# Patient Record
Sex: FEMALE | Race: ASIAN | URBAN - METROPOLITAN AREA
[De-identification: names, ages, dates, MRNs, and addresses within clinical notes are randomized per-mention and may not be internally consistent; named-entity substitution may affect disease eponyms.]

---

## 2017-01-03 ENCOUNTER — TELEPHONE (OUTPATIENT)
Dept: ALLERGY | Facility: CLINIC | Age: 37
End: 2017-01-03

## 2017-01-03 NOTE — TELEPHONE ENCOUNTER
Called and LM for patient to notify allergy shot hours have been shortened to 4pm today due to physician coverage.  Please arrive at least 15 minutes prior if planning to come in today.  Please call with any further questions or concerns.

## 2017-01-09 ENCOUNTER — NURSE ONLY (OUTPATIENT)
Dept: ALLERGY | Facility: CLINIC | Age: 37
End: 2017-01-09

## 2017-01-09 DIAGNOSIS — J30.89 ENVIRONMENTAL AND SEASONAL ALLERGIES: Primary | ICD-10-CM

## 2017-01-09 PROCEDURE — 95117 IMMUNOTHERAPY INJECTIONS: CPT | Performed by: INTERNAL MEDICINE

## 2017-01-31 ENCOUNTER — NURSE ONLY (OUTPATIENT)
Dept: ALLERGY | Facility: CLINIC | Age: 37
End: 2017-01-31

## 2017-01-31 DIAGNOSIS — J30.89 ENVIRONMENTAL AND SEASONAL ALLERGIES: Primary | ICD-10-CM

## 2017-01-31 PROCEDURE — 95117 IMMUNOTHERAPY INJECTIONS: CPT | Performed by: ALLERGY & IMMUNOLOGY

## 2017-02-02 RX ORDER — LEVOCETIRIZINE DIHYDROCHLORIDE 5 MG/1
5 TABLET, FILM COATED ORAL NIGHTLY
Qty: 30 TABLET | Refills: 3 | Status: SHIPPED | OUTPATIENT
Start: 2017-02-02 | End: 2017-06-20

## 2017-02-02 RX ORDER — OLOPATADINE HYDROCHLORIDE 665 UG/1
SPRAY NASAL
Qty: 1 BOTTLE | Refills: 4 | Status: SHIPPED | OUTPATIENT
Start: 2017-02-02 | End: 2017-02-06 | Stop reason: ALTCHOICE

## 2017-02-02 NOTE — TELEPHONE ENCOUNTER
Dr. Omar Huynh,. Per pt she was confused and is requesting refill for Xyzal 5 mg once daily x 4 months as aware due for OV 5/2017. Please advise on pending rx. No need to contact pt once rx sent unless denied.  Pt stts that xyzal works better than MARKELL Saucedo

## 2017-02-02 NOTE — TELEPHONE ENCOUNTER
Refill requested for:    Olopatadine HCl (PATANASE) 0.6 % Nasal Solution (Discontinued) 1 Bottle 0 12/12/2016 12/14/2016      Sig :  2 sprays per side bid prn       Route:   (none)       Last office visit:  12/12/16.   Previously advised to follow-up in:  6

## 2017-02-06 ENCOUNTER — OFFICE VISIT (OUTPATIENT)
Dept: INTERNAL MEDICINE CLINIC | Facility: CLINIC | Age: 37
End: 2017-02-06

## 2017-02-06 VITALS
WEIGHT: 141.38 LBS | TEMPERATURE: 98 F | SYSTOLIC BLOOD PRESSURE: 108 MMHG | OXYGEN SATURATION: 96 % | BODY MASS INDEX: 26.69 KG/M2 | DIASTOLIC BLOOD PRESSURE: 68 MMHG | HEART RATE: 82 BPM | HEIGHT: 61 IN

## 2017-02-06 DIAGNOSIS — J01.10 ACUTE FRONTAL SINUSITIS, RECURRENCE NOT SPECIFIED: Primary | ICD-10-CM

## 2017-02-06 PROCEDURE — 99212 OFFICE O/P EST SF 10 MIN: CPT | Performed by: INTERNAL MEDICINE

## 2017-02-06 PROCEDURE — 99213 OFFICE O/P EST LOW 20 MIN: CPT | Performed by: INTERNAL MEDICINE

## 2017-02-06 RX ORDER — CEFUROXIME AXETIL 500 MG/1
500 TABLET ORAL 2 TIMES DAILY
Qty: 20 TABLET | Refills: 0 | Status: SHIPPED | OUTPATIENT
Start: 2017-02-06 | End: 2017-06-20 | Stop reason: ALTCHOICE

## 2017-02-06 NOTE — PROGRESS NOTES
Don Meyer is a 43year old female.     HPI:   Patient presents with:  Sinus Problem: Sinus pressure, lethargy, non productive cough, sore throats 9 days       44 y/o F with 1 1/2 week h/o +sinus and nasal congestion; +post-nasal drip; +cough; +sputum; n Take 1 tablet (50 mg total) by mouth daily. Disp: 30 tablet Rfl: 0   Multiple Vitamins-Minerals (MULTI-VITAMIN/MINERALS) Oral Tab Take 1 tablet by mouth daily.  Disp:  Rfl:    Albuterol Sulfate HFA (PROAIR HFA) 108 (90 BASE) MCG/ACT Inhalation Aero Soln Inh

## 2017-02-27 ENCOUNTER — NURSE ONLY (OUTPATIENT)
Dept: ALLERGY | Facility: CLINIC | Age: 37
End: 2017-02-27

## 2017-02-27 DIAGNOSIS — J30.89 ENVIRONMENTAL AND SEASONAL ALLERGIES: Primary | ICD-10-CM

## 2017-02-27 PROCEDURE — 95165 ANTIGEN THERAPY SERVICES: CPT | Performed by: ALLERGY & IMMUNOLOGY

## 2017-02-27 PROCEDURE — 95117 IMMUNOTHERAPY INJECTIONS: CPT | Performed by: ALLERGY & IMMUNOLOGY

## 2017-03-08 NOTE — TELEPHONE ENCOUNTER
[facility] REQUESTING A REFILL FOR     SERTRALINE HCL 50MG TABS LAST FILL 11/8/16  #30   ONE TAB DAILY

## 2017-03-17 ENCOUNTER — HOSPITAL ENCOUNTER (OUTPATIENT)
Dept: GENERAL RADIOLOGY | Facility: HOSPITAL | Age: 37
Discharge: HOME | End: 2017-03-17
Attending: ORTHOPAEDIC SURGERY

## 2017-03-17 ENCOUNTER — OFFICE VISIT (OUTPATIENT)
Dept: OTHER | Facility: HOSPITAL | Age: 37
End: 2017-03-17
Attending: ORTHOPAEDIC SURGERY
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
## 2017-03-17 PROCEDURE — 71130 X-RAY STRENOCLAVIC JT 3/>VWS: CPT

## 2017-03-27 ENCOUNTER — NURSE ONLY (OUTPATIENT)
Dept: ALLERGY | Facility: CLINIC | Age: 37
End: 2017-03-27

## 2017-03-27 DIAGNOSIS — J30.89 ENVIRONMENTAL AND SEASONAL ALLERGIES: Primary | ICD-10-CM

## 2017-03-27 PROCEDURE — 95165 ANTIGEN THERAPY SERVICES: CPT | Performed by: ALLERGY & IMMUNOLOGY

## 2017-03-27 PROCEDURE — 95117 IMMUNOTHERAPY INJECTIONS: CPT | Performed by: ALLERGY & IMMUNOLOGY

## 2017-04-01 RX ORDER — LEVOCETIRIZINE DIHYDROCHLORIDE 5 MG/1
TABLET, FILM COATED ORAL
Qty: 30 TABLET | Refills: 5 | OUTPATIENT
Start: 2017-04-01

## 2017-04-01 NOTE — TELEPHONE ENCOUNTER
Received refill request for:    Medication Detail      Medication Quantity Refills Last Filled      Levocetirizine Dihydrochloride (XYZAL) 5 MG Oral Tab 30 tablet 3 2/2/2017      Sig :  Take 1 tablet (5 mg total) by mouth nightly.       Route:   Oral

## 2017-04-24 ENCOUNTER — NURSE ONLY (OUTPATIENT)
Dept: ALLERGY | Facility: CLINIC | Age: 37
End: 2017-04-24

## 2017-04-24 DIAGNOSIS — J30.89 ENVIRONMENTAL AND SEASONAL ALLERGIES: Primary | ICD-10-CM

## 2017-04-24 PROCEDURE — 95117 IMMUNOTHERAPY INJECTIONS: CPT | Performed by: ALLERGY & IMMUNOLOGY

## 2017-05-22 ENCOUNTER — NURSE ONLY (OUTPATIENT)
Dept: ALLERGY | Facility: CLINIC | Age: 37
End: 2017-05-22

## 2017-05-22 DIAGNOSIS — J30.89 ENVIRONMENTAL AND SEASONAL ALLERGIES: Primary | ICD-10-CM

## 2017-05-22 PROCEDURE — 95117 IMMUNOTHERAPY INJECTIONS: CPT | Performed by: ALLERGY & IMMUNOLOGY

## 2017-06-20 ENCOUNTER — OFFICE VISIT (OUTPATIENT)
Dept: ALLERGY | Facility: CLINIC | Age: 37
End: 2017-06-20

## 2017-06-20 VITALS
WEIGHT: 143 LBS | HEIGHT: 62 IN | RESPIRATION RATE: 24 BRPM | SYSTOLIC BLOOD PRESSURE: 94 MMHG | TEMPERATURE: 98 F | DIASTOLIC BLOOD PRESSURE: 64 MMHG | HEART RATE: 84 BPM | BODY MASS INDEX: 26.31 KG/M2

## 2017-06-20 DIAGNOSIS — J45.20 MILD INTERMITTENT ASTHMA WITHOUT COMPLICATION: Primary | ICD-10-CM

## 2017-06-20 DIAGNOSIS — J30.1 SEASONAL ALLERGIC RHINITIS DUE TO POLLEN: ICD-10-CM

## 2017-06-20 DIAGNOSIS — J32.9 CHRONIC SINUSITIS, UNSPECIFIED LOCATION: ICD-10-CM

## 2017-06-20 PROCEDURE — 99214 OFFICE O/P EST MOD 30 MIN: CPT | Performed by: ALLERGY & IMMUNOLOGY

## 2017-06-20 PROCEDURE — 99212 OFFICE O/P EST SF 10 MIN: CPT | Performed by: ALLERGY & IMMUNOLOGY

## 2017-06-20 RX ORDER — LEVOCETIRIZINE DIHYDROCHLORIDE 5 MG/1
5 TABLET, FILM COATED ORAL NIGHTLY
Qty: 30 TABLET | Refills: 5 | Status: SHIPPED | OUTPATIENT
Start: 2017-06-20 | End: 2020-04-09

## 2017-06-20 NOTE — PROGRESS NOTES
Alan Barlow is a 43year old female. HPI:   Patient presents with: Allergies    Patient is a 43-year-old female who presents for follow-up with a chief complaint of allergies.    Patient has a history of allergic rhinitis, chronic sinusitis and mild • Allergies Brother    • Cancer Father      NSCLC stage IV, nonsmoker, asbestos exposrure?    • Stroke Paternal Grandfather 48     smoker   • Cancer Paternal Grandmother      uterine   • Cancer Paternal Aunt 48     uterine   • Dementia Maternal Grandmothe intact   Ears/Audiometry: tympanic membranes are normal bilaterally hearing is grossly intact  Nose/Mouth/Throat: nose and throat are clear mucous membranes are moist   Neck/Thyroid: neck is supple without adenopathy  Lymphatic: no abnormal cervical, supra were provided solely for patient education and training related to self administration of these medications. Teaching, instruction and sample was provided to the patient by myself.   Teaching included  a review of potential adverse side effects as well as

## 2017-11-17 ENCOUNTER — OFFICE VISIT (OUTPATIENT)
Dept: INTERNAL MEDICINE CLINIC | Facility: CLINIC | Age: 37
End: 2017-11-17

## 2017-11-17 VITALS
BODY MASS INDEX: 26.84 KG/M2 | OXYGEN SATURATION: 98 % | TEMPERATURE: 98 F | DIASTOLIC BLOOD PRESSURE: 78 MMHG | HEIGHT: 61.5 IN | WEIGHT: 144 LBS | SYSTOLIC BLOOD PRESSURE: 100 MMHG | HEART RATE: 73 BPM

## 2017-11-17 DIAGNOSIS — F41.9 ANXIETY: ICD-10-CM

## 2017-11-17 DIAGNOSIS — E78.5 DYSLIPIDEMIA: ICD-10-CM

## 2017-11-17 DIAGNOSIS — R53.83 FATIGUE, UNSPECIFIED TYPE: ICD-10-CM

## 2017-11-17 DIAGNOSIS — Z00.00 ANNUAL PHYSICAL EXAM: Primary | ICD-10-CM

## 2017-11-17 DIAGNOSIS — Z12.4 SCREENING FOR CERVICAL CANCER: ICD-10-CM

## 2017-11-17 PROCEDURE — 99395 PREV VISIT EST AGE 18-39: CPT | Performed by: INTERNAL MEDICINE

## 2017-11-17 RX ORDER — SERTRALINE HYDROCHLORIDE 25 MG/1
25 TABLET, FILM COATED ORAL DAILY
Qty: 30 TABLET | Refills: 3 | Status: SHIPPED | OUTPATIENT
Start: 2017-11-17 | End: 2020-09-16

## 2017-11-17 NOTE — PROGRESS NOTES
Leonie Moraes is a 42year old female. Patient presents with:  Physical: Pt states she needs a pap      HPI:   Leonie Moraes is a 42year old female who presents for her annual physical exam    In terms of past medical history, she has asthma but is much be tablet (5 mg total) by mouth nightly. Disp: 30 tablet Rfl: 5   saccharomyces boulardii 250 MG Oral Cap Take 250 mg by mouth 2 (two) times daily. Disp:  Rfl:    Multiple Vitamins-Minerals (MULTI-VITAMIN/MINERALS) Oral Tab Take 1 tablet by mouth daily.  Disp: Smokeless tobacco: Never Used                      Alcohol use:  Yes              Comment: Per NG: Occasionally         REVIEW OF SYSTEMS:   GENERAL: feels well otherwise  SKIN: denies any unusual skin lesions  EYES:denies blurred vision or double visio

## 2018-03-16 ENCOUNTER — TELEPHONE (OUTPATIENT)
Dept: INTERNAL MEDICINE CLINIC | Facility: CLINIC | Age: 38
End: 2018-03-16

## 2018-03-16 DIAGNOSIS — Z01.818 PREOPERATIVE EXAMINATION: Primary | ICD-10-CM

## 2018-03-16 NOTE — TELEPHONE ENCOUNTER
Called and Relayed MD's message to patient---verbalized understanding.  Patient already made a pre-op appt with DR. Meenu Hopson for Monday 3/19/18/ She will go to have labs done this weekend at [facility].

## 2018-03-16 NOTE — TELEPHONE ENCOUNTER
Please notify patient that prior to surgery Dr. Eze Scott is requesting preoperative clearance. I have placed orders for labs to be done (she should do these at the [facility] or [facility]), along with an EKG.  She can do these this weekend if she would li

## 2018-03-17 ENCOUNTER — APPOINTMENT (OUTPATIENT)
Dept: LAB | Facility: HOSPITAL | Age: 38
End: 2018-03-17
Attending: INTERNAL MEDICINE
Payer: COMMERCIAL

## 2018-03-17 DIAGNOSIS — Z01.818 PREOPERATIVE EXAMINATION: ICD-10-CM

## 2018-03-17 LAB
ALBUMIN SERPL BCP-MCNC: 4.2 G/DL (ref 3.5–4.8)
ALBUMIN/GLOB SERPL: 1.7 {RATIO} (ref 1–2)
ALP SERPL-CCNC: 46 U/L (ref 32–100)
ALT SERPL-CCNC: 15 U/L (ref 14–54)
ANION GAP SERPL CALC-SCNC: 9 MMOL/L (ref 0–18)
APTT PPP: 28.1 SECONDS (ref 23.2–35.3)
AST SERPL-CCNC: 19 U/L (ref 15–41)
BACTERIA UR QL AUTO: NEGATIVE /HPF
BASOPHILS # BLD: 0 K/UL (ref 0–0.2)
BASOPHILS NFR BLD: 1 %
BILIRUB SERPL-MCNC: 1.3 MG/DL (ref 0.3–1.2)
BILIRUB UR QL: NEGATIVE
BUN SERPL-MCNC: 15 MG/DL (ref 8–20)
BUN/CREAT SERPL: 20.5 (ref 10–20)
CALCIUM SERPL-MCNC: 8.9 MG/DL (ref 8.5–10.5)
CHLORIDE SERPL-SCNC: 103 MMOL/L (ref 95–110)
CLARITY UR: CLEAR
CO2 SERPL-SCNC: 24 MMOL/L (ref 22–32)
COLOR UR: YELLOW
CREAT SERPL-MCNC: 0.73 MG/DL (ref 0.5–1.5)
EOSINOPHIL # BLD: 0.1 K/UL (ref 0–0.7)
EOSINOPHIL NFR BLD: 2 %
ERYTHROCYTE [DISTWIDTH] IN BLOOD BY AUTOMATED COUNT: 13.3 % (ref 11–15)
GLOBULIN PLAS-MCNC: 2.5 G/DL (ref 2.5–3.7)
GLUCOSE SERPL-MCNC: 97 MG/DL (ref 70–99)
GLUCOSE UR-MCNC: NEGATIVE MG/DL
HCT VFR BLD AUTO: 42 % (ref 35–48)
HGB BLD-MCNC: 14.3 G/DL (ref 12–16)
HGB UR QL STRIP.AUTO: NEGATIVE
INR BLD: 1 (ref 0.9–1.2)
KETONES UR-MCNC: 20 MG/DL
LYMPHOCYTES # BLD: 1.3 K/UL (ref 1–4)
LYMPHOCYTES NFR BLD: 21 %
MCH RBC QN AUTO: 32.1 PG (ref 27–32)
MCHC RBC AUTO-ENTMCNC: 34.1 G/DL (ref 32–37)
MCV RBC AUTO: 94.1 FL (ref 80–100)
MONOCYTES # BLD: 0.4 K/UL (ref 0–1)
MONOCYTES NFR BLD: 6 %
MRSA NASAL: NEGATIVE
NEUTROPHILS # BLD AUTO: 4.3 K/UL (ref 1.8–7.7)
NEUTROPHILS NFR BLD: 70 %
NITRITE UR QL STRIP.AUTO: NEGATIVE
OSMOLALITY UR CALC.SUM OF ELEC: 283 MOSM/KG (ref 275–295)
PATIENT FASTING: NO
PH UR: 5 [PH] (ref 5–8)
PLATELET # BLD AUTO: 213 K/UL (ref 140–400)
PMV BLD AUTO: 9.6 FL (ref 7.4–10.3)
POTASSIUM SERPL-SCNC: 4 MMOL/L (ref 3.3–5.1)
PROT SERPL-MCNC: 6.7 G/DL (ref 5.9–8.4)
PROT UR-MCNC: NEGATIVE MG/DL
PROTHROMBIN TIME: 13.2 SECONDS (ref 11.8–14.5)
RBC # BLD AUTO: 4.47 M/UL (ref 3.7–5.4)
RBC #/AREA URNS AUTO: 1 /HPF
SODIUM SERPL-SCNC: 136 MMOL/L (ref 136–144)
SP GR UR STRIP: 1.03 (ref 1–1.03)
STAPH A BY PCR: NEGATIVE
UROBILINOGEN UR STRIP-ACNC: <2
VIT C UR-MCNC: 40 MG/DL
WBC # BLD AUTO: 6.1 K/UL (ref 4–11)
WBC #/AREA URNS AUTO: 1 /HPF

## 2018-03-17 PROCEDURE — 93005 ELECTROCARDIOGRAM TRACING: CPT

## 2018-03-17 PROCEDURE — 81001 URINALYSIS AUTO W/SCOPE: CPT | Performed by: INTERNAL MEDICINE

## 2018-03-17 PROCEDURE — 36415 COLL VENOUS BLD VENIPUNCTURE: CPT | Performed by: INTERNAL MEDICINE

## 2018-03-17 PROCEDURE — 93010 ELECTROCARDIOGRAM REPORT: CPT | Performed by: INTERNAL MEDICINE

## 2018-03-17 PROCEDURE — 85025 COMPLETE CBC W/AUTO DIFF WBC: CPT | Performed by: INTERNAL MEDICINE

## 2018-03-17 PROCEDURE — 87640 STAPH A DNA AMP PROBE: CPT

## 2018-03-17 PROCEDURE — 85730 THROMBOPLASTIN TIME PARTIAL: CPT

## 2018-03-17 PROCEDURE — 87641 MR-STAPH DNA AMP PROBE: CPT

## 2018-03-17 PROCEDURE — 80053 COMPREHEN METABOLIC PANEL: CPT | Performed by: INTERNAL MEDICINE

## 2018-03-17 PROCEDURE — 85610 PROTHROMBIN TIME: CPT

## 2018-03-19 ENCOUNTER — OFFICE VISIT (OUTPATIENT)
Dept: INTERNAL MEDICINE CLINIC | Facility: CLINIC | Age: 38
End: 2018-03-19

## 2018-03-19 VITALS
WEIGHT: 148 LBS | TEMPERATURE: 98 F | HEIGHT: 61 IN | SYSTOLIC BLOOD PRESSURE: 110 MMHG | DIASTOLIC BLOOD PRESSURE: 80 MMHG | OXYGEN SATURATION: 98 % | HEART RATE: 82 BPM | BODY MASS INDEX: 27.94 KG/M2

## 2018-03-19 DIAGNOSIS — F41.9 ANXIETY: ICD-10-CM

## 2018-03-19 DIAGNOSIS — Z01.818 PREOPERATIVE EXAMINATION: Primary | ICD-10-CM

## 2018-03-19 DIAGNOSIS — J45.20 MILD INTERMITTENT ASTHMA WITHOUT COMPLICATION: ICD-10-CM

## 2018-03-19 DIAGNOSIS — R11.0 POSTOPERATIVE NAUSEA: ICD-10-CM

## 2018-03-19 DIAGNOSIS — Z98.890 POSTOPERATIVE NAUSEA: ICD-10-CM

## 2018-03-19 PROCEDURE — 99214 OFFICE O/P EST MOD 30 MIN: CPT | Performed by: INTERNAL MEDICINE

## 2018-03-19 PROCEDURE — 99212 OFFICE O/P EST SF 10 MIN: CPT | Performed by: INTERNAL MEDICINE

## 2018-03-19 NOTE — PROGRESS NOTES
Sangeeta Landry is a 42year old female. Patient presents with:  Pre-Op Exam: Patient presents for pre-op clearance for lumbar fussion on L5-C1 w/ Dr. Jeff Starkey.        HPI:   Sangeeta Landry is a 42year old female who presents for a preoperative evaluation f 50 MCG/ACT Nasal Suspension 1 spray by Nasal route daily.  Disp: 1 Bottle Rfl: 5      Past Medical History:   Diagnosis Date   • Allergic rhinitis    • Allergy     Per NG: Decensitization   • Anesthesia complication    • Anxiety    • Asthma     allergy dalila wheezing  CARDIOVASCULAR: denies chest pain, pressure, or palpitations  GI: denies abdominal pain, nausea, vomiting, diarrhea, constipation, hematochezia, or melena  : denies dysuria, urinary frequency  NEURO: denies headaches, dizziness, focal deficits

## 2018-04-12 ENCOUNTER — APPOINTMENT (OUTPATIENT)
Dept: LAB | Age: 38
End: 2018-04-12
Attending: ORTHOPAEDIC SURGERY
Payer: COMMERCIAL

## 2018-04-12 DIAGNOSIS — Z01.818 PRE-OP EXAM: ICD-10-CM

## 2018-04-12 PROCEDURE — 81001 URINALYSIS AUTO W/SCOPE: CPT

## 2018-04-16 ENCOUNTER — SURGERY (OUTPATIENT)
Age: 38
End: 2018-04-16

## 2018-04-16 ENCOUNTER — HOSPITAL ENCOUNTER (INPATIENT)
Facility: HOSPITAL | Age: 38
LOS: 1 days | Discharge: HOME | DRG: 455 | End: 2018-04-17
Attending: ORTHOPAEDIC SURGERY | Admitting: ORTHOPAEDIC SURGERY
Payer: COMMERCIAL

## 2018-04-16 ENCOUNTER — ANESTHESIA (OUTPATIENT)
Dept: SURGERY | Facility: HOSPITAL | Age: 38
DRG: 455 | End: 2018-04-16
Payer: COMMERCIAL

## 2018-04-16 ENCOUNTER — APPOINTMENT (OUTPATIENT)
Dept: GENERAL RADIOLOGY | Facility: HOSPITAL | Age: 38
DRG: 455 | End: 2018-04-16
Attending: ORTHOPAEDIC SURGERY
Payer: COMMERCIAL

## 2018-04-16 ENCOUNTER — ANESTHESIA EVENT (OUTPATIENT)
Dept: SURGERY | Facility: HOSPITAL | Age: 38
DRG: 455 | End: 2018-04-16
Payer: COMMERCIAL

## 2018-04-16 DIAGNOSIS — M43.16 SPONDYLOLISTHESIS, LUMBAR REGION: ICD-10-CM

## 2018-04-16 DIAGNOSIS — M51.26 HNP (HERNIATED NUCLEUS PULPOSUS), LUMBAR: ICD-10-CM

## 2018-04-16 DIAGNOSIS — M51.26 HERNIATED NUCLEUS PULPOSUS, LUMBAR: Primary | ICD-10-CM

## 2018-04-16 PROCEDURE — 0SG30AJ FUSION OF LUMBOSACRAL JOINT WITH INTERBODY FUSION DEVICE, POSTERIOR APPROACH, ANTERIOR COLUMN, OPEN APPROACH: ICD-10-PCS | Performed by: ORTHOPAEDIC SURGERY

## 2018-04-16 PROCEDURE — 0ST40ZZ RESECTION OF LUMBOSACRAL DISC, OPEN APPROACH: ICD-10-PCS | Performed by: ORTHOPAEDIC SURGERY

## 2018-04-16 PROCEDURE — 00NT0ZZ RELEASE SPINAL MENINGES, OPEN APPROACH: ICD-10-PCS | Performed by: ORTHOPAEDIC SURGERY

## 2018-04-16 PROCEDURE — 99252 IP/OBS CONSLTJ NEW/EST SF 35: CPT | Performed by: HOSPITALIST

## 2018-04-16 PROCEDURE — 76001 XR FLUOROSCOPE EXAM >1 HR EXTENSIVE (CPT=76001): CPT | Performed by: ORTHOPAEDIC SURGERY

## 2018-04-16 PROCEDURE — 4A11X4G MONITORING OF PERIPHERAL NERVOUS ELECTRICAL ACTIVITY, INTRAOPERATIVE, EXTERNAL APPROACH: ICD-10-PCS | Performed by: ORTHOPAEDIC SURGERY

## 2018-04-16 PROCEDURE — 0SG3071 FUSION OF LUMBOSACRAL JOINT WITH AUTOLOGOUS TISSUE SUBSTITUTE, POSTERIOR APPROACH, POSTERIOR COLUMN, OPEN APPROACH: ICD-10-PCS | Performed by: ORTHOPAEDIC SURGERY

## 2018-04-16 PROCEDURE — 07DS3ZZ EXTRACTION OF VERTEBRAL BONE MARROW, PERCUTANEOUS APPROACH: ICD-10-PCS | Performed by: ORTHOPAEDIC SURGERY

## 2018-04-16 DEVICE — RELINE MAS TI ROD 5.5X40 LRDTC: Type: IMPLANTABLE DEVICE | Site: BACK | Status: FUNCTIONAL

## 2018-04-16 DEVICE — RELINE MAS RED SCREW 6.5X40 2C: Type: IMPLANTABLE DEVICE | Site: BACK | Status: FUNCTIONAL

## 2018-04-16 DEVICE — RELINE MAS MOD SCREW 6.5X45 2C: Type: IMPLANTABLE DEVICE | Site: BACK | Status: FUNCTIONAL

## 2018-04-16 DEVICE — RELINE MAS RED SCREW 6.5X45 2C: Type: IMPLANTABLE DEVICE | Site: BACK | Status: FUNCTIONAL

## 2018-04-16 DEVICE — RELINE LCK SCRW 5.5 OPEN TULIP: Type: IMPLANTABLE DEVICE | Site: BACK | Status: FUNCTIONAL

## 2018-04-16 DEVICE — OSTEOCEL PRO BONE MATRIX LG: Type: IMPLANTABLE DEVICE | Site: BACK | Status: FUNCTIONAL

## 2018-04-16 DEVICE — RELINE MAS MOD REDUCTION EXT: Type: IMPLANTABLE DEVICE | Site: BACK | Status: FUNCTIONAL

## 2018-04-16 DEVICE — OBLIQUE TLIF 8X10X30MM 12D: Type: IMPLANTABLE DEVICE | Site: BACK | Status: FUNCTIONAL

## 2018-04-16 DEVICE — RELINE MAS MOD SCREW 6.5X40 2C: Type: IMPLANTABLE DEVICE | Site: BACK | Status: FUNCTIONAL

## 2018-04-16 RX ORDER — BACITRACIN 50000 [USP'U]/1
INJECTION, POWDER, LYOPHILIZED, FOR SOLUTION INTRAMUSCULAR AS NEEDED
Status: DISCONTINUED | OUTPATIENT
Start: 2018-04-16 | End: 2018-04-16 | Stop reason: HOSPADM

## 2018-04-16 RX ORDER — FLUTICASONE PROPIONATE 50 MCG
1 SPRAY, SUSPENSION (ML) NASAL DAILY
Status: DISCONTINUED | OUTPATIENT
Start: 2018-04-16 | End: 2018-04-17

## 2018-04-16 RX ORDER — SODIUM CHLORIDE 9 MG/ML
INJECTION, SOLUTION INTRAVENOUS CONTINUOUS
Status: DISCONTINUED | OUTPATIENT
Start: 2018-04-16 | End: 2018-04-17

## 2018-04-16 RX ORDER — HYDROCODONE BITARTRATE AND ACETAMINOPHEN 10; 325 MG/1; MG/1
1 TABLET ORAL AS NEEDED
Status: DISCONTINUED | OUTPATIENT
Start: 2018-04-16 | End: 2018-04-16 | Stop reason: HOSPADM

## 2018-04-16 RX ORDER — DIPHENHYDRAMINE HYDROCHLORIDE 50 MG/ML
25 INJECTION INTRAMUSCULAR; INTRAVENOUS EVERY 4 HOURS PRN
Status: DISCONTINUED | OUTPATIENT
Start: 2018-04-16 | End: 2018-04-17

## 2018-04-16 RX ORDER — HYDROCODONE BITARTRATE AND ACETAMINOPHEN 10; 325 MG/1; MG/1
2 TABLET ORAL AS NEEDED
Status: DISCONTINUED | OUTPATIENT
Start: 2018-04-16 | End: 2018-04-16 | Stop reason: HOSPADM

## 2018-04-16 RX ORDER — OXYCODONE HYDROCHLORIDE 10 MG/1
10 TABLET ORAL EVERY 4 HOURS PRN
Status: DISCONTINUED | OUTPATIENT
Start: 2018-04-16 | End: 2018-04-17

## 2018-04-16 RX ORDER — POLYETHYLENE GLYCOL 3350 17 G/17G
17 POWDER, FOR SOLUTION ORAL DAILY PRN
Status: DISCONTINUED | OUTPATIENT
Start: 2018-04-16 | End: 2018-04-17

## 2018-04-16 RX ORDER — CEFAZOLIN SODIUM/WATER 2 G/20 ML
2 SYRINGE (ML) INTRAVENOUS ONCE
Status: COMPLETED | OUTPATIENT
Start: 2018-04-16 | End: 2018-04-16

## 2018-04-16 RX ORDER — NALOXONE HYDROCHLORIDE 0.4 MG/ML
80 INJECTION, SOLUTION INTRAMUSCULAR; INTRAVENOUS; SUBCUTANEOUS AS NEEDED
Status: DISCONTINUED | OUTPATIENT
Start: 2018-04-16 | End: 2018-04-16 | Stop reason: HOSPADM

## 2018-04-16 RX ORDER — BISACODYL 10 MG
10 SUPPOSITORY, RECTAL RECTAL
Status: DISCONTINUED | OUTPATIENT
Start: 2018-04-16 | End: 2018-04-17

## 2018-04-16 RX ORDER — SERTRALINE HYDROCHLORIDE 25 MG/1
25 TABLET, FILM COATED ORAL DAILY
Status: DISCONTINUED | OUTPATIENT
Start: 2018-04-16 | End: 2018-04-17

## 2018-04-16 RX ORDER — SODIUM PHOSPHATE, DIBASIC AND SODIUM PHOSPHATE, MONOBASIC 7; 19 G/133ML; G/133ML
1 ENEMA RECTAL ONCE AS NEEDED
Status: DISCONTINUED | OUTPATIENT
Start: 2018-04-16 | End: 2018-04-17

## 2018-04-16 RX ORDER — METOCLOPRAMIDE HYDROCHLORIDE 5 MG/ML
10 INJECTION INTRAMUSCULAR; INTRAVENOUS AS NEEDED
Status: DISCONTINUED | OUTPATIENT
Start: 2018-04-16 | End: 2018-04-16 | Stop reason: HOSPADM

## 2018-04-16 RX ORDER — MORPHINE SULFATE 4 MG/ML
2 INJECTION, SOLUTION INTRAMUSCULAR; INTRAVENOUS EVERY 2 HOUR PRN
Status: DISCONTINUED | OUTPATIENT
Start: 2018-04-16 | End: 2018-04-17

## 2018-04-16 RX ORDER — MEPERIDINE HYDROCHLORIDE 25 MG/ML
12.5 INJECTION INTRAMUSCULAR; INTRAVENOUS; SUBCUTANEOUS AS NEEDED
Status: DISCONTINUED | OUTPATIENT
Start: 2018-04-16 | End: 2018-04-16 | Stop reason: HOSPADM

## 2018-04-16 RX ORDER — MORPHINE SULFATE 4 MG/ML
INJECTION, SOLUTION INTRAMUSCULAR; INTRAVENOUS
Status: COMPLETED
Start: 2018-04-16 | End: 2018-04-16

## 2018-04-16 RX ORDER — MIDAZOLAM HYDROCHLORIDE 1 MG/ML
1 INJECTION INTRAMUSCULAR; INTRAVENOUS EVERY 5 MIN PRN
Status: DISCONTINUED | OUTPATIENT
Start: 2018-04-16 | End: 2018-04-16 | Stop reason: HOSPADM

## 2018-04-16 RX ORDER — CELECOXIB 200 MG/1
200 CAPSULE ORAL ONCE
Status: COMPLETED | OUTPATIENT
Start: 2018-04-16 | End: 2018-04-16

## 2018-04-16 RX ORDER — OXYCODONE HYDROCHLORIDE 5 MG/1
5 TABLET ORAL EVERY 4 HOURS PRN
Status: DISCONTINUED | OUTPATIENT
Start: 2018-04-16 | End: 2018-04-17

## 2018-04-16 RX ORDER — DOCUSATE SODIUM 100 MG/1
100 CAPSULE, LIQUID FILLED ORAL 2 TIMES DAILY
Status: DISCONTINUED | OUTPATIENT
Start: 2018-04-16 | End: 2018-04-17

## 2018-04-16 RX ORDER — ACETAMINOPHEN 500 MG
1000 TABLET ORAL ONCE
COMMUNITY
End: 2018-04-30

## 2018-04-16 RX ORDER — MORPHINE SULFATE 15 MG/1
15 TABLET ORAL EVERY 4 HOURS PRN
Status: DISCONTINUED | OUTPATIENT
Start: 2018-04-16 | End: 2018-04-17

## 2018-04-16 RX ORDER — CETIRIZINE HYDROCHLORIDE 10 MG/1
10 TABLET ORAL NIGHTLY
Status: DISCONTINUED | OUTPATIENT
Start: 2018-04-16 | End: 2018-04-17

## 2018-04-16 RX ORDER — MORPHINE SULFATE 4 MG/ML
1 INJECTION, SOLUTION INTRAMUSCULAR; INTRAVENOUS EVERY 2 HOUR PRN
Status: DISCONTINUED | OUTPATIENT
Start: 2018-04-16 | End: 2018-04-17

## 2018-04-16 RX ORDER — MORPHINE SULFATE 4 MG/ML
2 INJECTION, SOLUTION INTRAMUSCULAR; INTRAVENOUS EVERY 5 MIN PRN
Status: DISCONTINUED | OUTPATIENT
Start: 2018-04-16 | End: 2018-04-16 | Stop reason: HOSPADM

## 2018-04-16 RX ORDER — SENNOSIDES 8.6 MG
17.2 TABLET ORAL NIGHTLY
Status: DISCONTINUED | OUTPATIENT
Start: 2018-04-16 | End: 2018-04-17

## 2018-04-16 RX ORDER — SODIUM CHLORIDE, SODIUM LACTATE, POTASSIUM CHLORIDE, CALCIUM CHLORIDE 600; 310; 30; 20 MG/100ML; MG/100ML; MG/100ML; MG/100ML
INJECTION, SOLUTION INTRAVENOUS CONTINUOUS
Status: DISCONTINUED | OUTPATIENT
Start: 2018-04-16 | End: 2018-04-17

## 2018-04-16 RX ORDER — METOCLOPRAMIDE HYDROCHLORIDE 5 MG/ML
10 INJECTION INTRAMUSCULAR; INTRAVENOUS EVERY 6 HOURS PRN
Status: DISCONTINUED | OUTPATIENT
Start: 2018-04-16 | End: 2018-04-17

## 2018-04-16 RX ORDER — CYCLOBENZAPRINE HCL 10 MG
10 TABLET ORAL 3 TIMES DAILY PRN
Status: DISCONTINUED | OUTPATIENT
Start: 2018-04-16 | End: 2018-04-17

## 2018-04-16 RX ORDER — SODIUM CHLORIDE, SODIUM LACTATE, POTASSIUM CHLORIDE, CALCIUM CHLORIDE 600; 310; 30; 20 MG/100ML; MG/100ML; MG/100ML; MG/100ML
INJECTION, SOLUTION INTRAVENOUS CONTINUOUS
Status: DISCONTINUED | OUTPATIENT
Start: 2018-04-16 | End: 2018-04-16 | Stop reason: HOSPADM

## 2018-04-16 RX ORDER — CEFAZOLIN SODIUM/WATER 2 G/20 ML
2 SYRINGE (ML) INTRAVENOUS EVERY 8 HOURS
Status: COMPLETED | OUTPATIENT
Start: 2018-04-16 | End: 2018-04-17

## 2018-04-16 RX ORDER — ONDANSETRON 2 MG/ML
4 INJECTION INTRAMUSCULAR; INTRAVENOUS ONCE
Status: COMPLETED | OUTPATIENT
Start: 2018-04-16 | End: 2018-04-16

## 2018-04-16 RX ORDER — ONDANSETRON 2 MG/ML
4 INJECTION INTRAMUSCULAR; INTRAVENOUS EVERY 4 HOURS PRN
Status: DISPENSED | OUTPATIENT
Start: 2018-04-16 | End: 2018-04-17

## 2018-04-16 RX ORDER — BUPIVACAINE HYDROCHLORIDE AND EPINEPHRINE 5; 5 MG/ML; UG/ML
INJECTION, SOLUTION EPIDURAL; INTRACAUDAL; PERINEURAL AS NEEDED
Status: DISCONTINUED | OUTPATIENT
Start: 2018-04-16 | End: 2018-04-16 | Stop reason: HOSPADM

## 2018-04-16 RX ORDER — ONDANSETRON 2 MG/ML
4 INJECTION INTRAMUSCULAR; INTRAVENOUS AS NEEDED
Status: DISCONTINUED | OUTPATIENT
Start: 2018-04-16 | End: 2018-04-16 | Stop reason: HOSPADM

## 2018-04-16 RX ORDER — GABAPENTIN 600 MG/1
600 TABLET ORAL ONCE
Status: COMPLETED | OUTPATIENT
Start: 2018-04-16 | End: 2018-04-16

## 2018-04-16 RX ORDER — MORPHINE SULFATE 4 MG/ML
4 INJECTION, SOLUTION INTRAMUSCULAR; INTRAVENOUS EVERY 2 HOUR PRN
Status: DISCONTINUED | OUTPATIENT
Start: 2018-04-16 | End: 2018-04-17

## 2018-04-16 RX ORDER — DIPHENHYDRAMINE HCL 25 MG
25 CAPSULE ORAL EVERY 4 HOURS PRN
Status: DISCONTINUED | OUTPATIENT
Start: 2018-04-16 | End: 2018-04-17

## 2018-04-16 NOTE — ANESTHESIA POSTPROCEDURE EVALUATION
[address] [facility] Patient Status:  Surgery Admit   Age/Gender 42year old female MRN [id number]   Location [address] [facility] Attending Makenzie Baird MD   Hosp Day # 0 PCP Josias Sanchez DO       Anesthesia Post-op Note

## 2018-04-16 NOTE — PROGRESS NOTES
S: She has controlled back pain no leg pain. No numbness  Inspection:  Awake alert No acute distress. No difficulty breathing  Sitting in chair    Blood pressure 107/75, pulse 84, temperature 98.6 °F (37 °C), temperature source Oral, resp.  rate 18, height

## 2018-04-16 NOTE — ANESTHESIA PREPROCEDURE EVALUATION
PRE-OP EVALUATION    Patient Name: Geraldine Pandya    Pre-op Diagnosis: HNP (herniated nucleus pulposus), lumbar [M51.26]  Spondylolisthesis, lumbar region [M43.16]    Procedure(s):  Lumbar 5- Sacral 1 revision decompression fusion      Surgeon(s) and Role: Vladislav Reed MD;  Location: [facility]  No date: CORRECT BUNION,OTHR METHODS      Comment: bilateral  3/2015: LEG/ANKLE SURGERY PROC UNLISTED Right      Comment: Ligament repair.    No date: SINUS SURGERY        Comment: sept

## 2018-04-16 NOTE — BRIEF OP NOTE
Pre-Operative Diagnosis: HNP (herniated nucleus pulposus), lumbar [M51.26]  Spondylolisthesis, lumbar region [M43.16]     Post-Operative Diagnosis: same      Procedure Performed:   Procedure(s):  Lumbar 5- Sacral 1 revision decompression fusion      Surgeo

## 2018-04-16 NOTE — CONSULTS
[facility]  [address] Patient Status:  Inpatient    11/15/1980 MRN [id number]   [facility]SW-A Attending Trina Villegas MD   Hosp Day # 0 PCP Juan Diego Brar DO     Reason for consult: Medical management    Requeste asbestos exposrure?    • Stroke Paternal Grandfather 48     smoker   • Cancer Paternal Grandmother      uterine   • Cancer Paternal Aunt 48     uterine   • Dementia Maternal Grandmother 79   • Cancer Maternal Grandfather      endometrial       Allergies: No edema or cyanosis. Integument: No rashes or lesions. Psychiatric: Appropriate mood and affect.       Diagnostic Data:      Labs:  Recent Labs   Lab  04/16/18   1138   WBC  10.3   HGB  13.3   MCV  94.4   PLT  194.0       No results for input(s): GLU, B

## 2018-04-17 VITALS
BODY MASS INDEX: 28.14 KG/M2 | RESPIRATION RATE: 16 BRPM | WEIGHT: 149.06 LBS | HEIGHT: 61 IN | OXYGEN SATURATION: 98 % | SYSTOLIC BLOOD PRESSURE: 103 MMHG | TEMPERATURE: 98 F | HEART RATE: 78 BPM | DIASTOLIC BLOOD PRESSURE: 66 MMHG

## 2018-04-17 RX ORDER — HYDROCODONE BITARTRATE AND ACETAMINOPHEN 10; 325 MG/1; MG/1
1 TABLET ORAL EVERY 6 HOURS PRN
Status: DISCONTINUED | OUTPATIENT
Start: 2018-04-17 | End: 2018-04-17

## 2018-04-17 RX ORDER — PSEUDOEPHEDRINE HCL 30 MG
TABLET ORAL
Qty: 60 CAPSULE | Refills: 0 | Status: SHIPPED | COMMUNITY
Start: 2018-04-17 | End: 2018-07-10

## 2018-04-17 RX ORDER — POLYETHYLENE GLYCOL 3350 17 G/17G
17 POWDER, FOR SOLUTION ORAL DAILY PRN
Qty: 10 EACH | Refills: 0 | Status: SHIPPED | COMMUNITY
Start: 2018-04-17 | End: 2018-07-10

## 2018-04-17 NOTE — PHYSICAL THERAPY NOTE
PHYSICAL THERAPY QUICK EVALUATION - INPATIENT    Room Number: 381/381-A  Evaluation Date: 4/17/2018  Presenting Problem: s/p L5S1 revision decomp fusion 4/16/18  Physician Order: PT Eval and Treat    Problem List  Active Problems:    Lumbar radiculopathy ASSESSMENT  Upper extremity ROM and strength are within functional limits     Lower extremity ROM is within functional limits     Lower extremity strength is within functional limits     NEUROLOGICAL FINDINGS  Neurological Findings: None 4/16/2018 for L5S1 revision decomp fusion . Pertinent comorbidities and personal factors impacting therapy include h/o fusion 2009.   Functional outcome measures completed include Results of the AM-PAC \"6 clicks\" Inpatient Daily Mobility Short Form for t

## 2018-04-17 NOTE — PROGRESS NOTES
S: he has controlled back pain no leg pain. She has been up walking last night and today. She wants to go home    Inspection:  Awake alert No acute distress.  No difficulty breathing     Blood pressure 101/66, pulse 85, temperature 98.5 °F (36.9 °C), temp

## 2018-04-17 NOTE — PLAN OF CARE
Pt arrived from PACU on bed. VSS. Ambulated to bathroom with SB assist on arrival to floor. Family at bedside. Oriented to room and call light system. Educated on fall precautions. Denies nausea and rates pain \"mild. \" Chairback brace at bedside, pt educa

## 2018-04-17 NOTE — PLAN OF CARE
PAIN - ADULT    • Verbalizes/displays adequate comfort level or patient's stated pain goal Progressing        Patient/Family Goals    • Patient/Family Short Term Goal Progressing        SAFETY ADULT - FALL    • Free from fall injury Progressing        Barbie

## 2018-04-17 NOTE — OCCUPATIONAL THERAPY NOTE
OCCUPATIONAL THERAPY QUICK EVALUATION - INPATIENT    Room Number: 381/381-A  Evaluation Date: 4/17/2018     Type of Evaluation: Quick Eval  Presenting Problem:  (L5-S1 revision decompression fusion)    Physician Order: IP Consult to Occupational Therapy  R of Function: Independent, Lives c spouse who can assist as needed    SUBJECTIVE   \"I'll need a bigger lab coat to hide this brace\"    Patient self-stated goal is to go home    OBJECTIVE  Precautions: Lumbar brace;Spine  Fall Risk: Standard fall risk    W Patient End of Session: Up in chair;Needs met;Call light within reach;RN aware of session/findings; All patient questions and concerns addressed; Family present    ASSESSMENT     Patient is a 42year old female admitted on 4/16/2018 for elective L5-S1 re

## 2018-04-27 NOTE — OPERATIVE REPORT
[facility]    PATIENT'S NAME: Damaris Dobbs   ATTENDING PHYSICIAN: David Sommers M.D. OPERATING PHYSICIAN: David Sommers M.D.    PATIENT ACCOUNT#:   [de-identified]    LOCATION:  [address] [place] [address]  MEDICAL RECORD #:   [id number]       DATE OF BIRTH:  [birth year] evaluating all neuromonitoring data. All bony prominences were padded. The back was sterilely prepped and draped. AP fluoroscopy was wheeled in. We marked pedicles at all levels outlined above bilaterally.   Two separate incisions 1-1/2 fingerbreadths l electrocautery was used to remove remaining tissue over the pars and facet. Pre-operative measurements documenting a mismatch between pelvic incidence and lumbar lordosis was made, thus documenting clinically significant kyphosis.     Our attention first patient had prior surgery at this level, an extensive lysis of adhesions ventrally and dorsally was required to mobilize the nerve roots. This required fine curets and Vasquez used in sequential fashion with 2 and 3 mm Kerrison rongeurs.   This procedure re were thoroughly irrigated. Hemostasis was obtained. Waterford Lout was placed over the neural elements. Tulip heads were placed on the contralateral side. Then, with dilators in place, the screws were placed.   Instrumentation was placed on the right over K-

## 2018-07-30 ENCOUNTER — OFFICE VISIT (OUTPATIENT)
Dept: OTHER | Facility: HOSPITAL | Age: 38
End: 2018-07-30
Attending: EMERGENCY MEDICINE

## 2018-07-30 ENCOUNTER — HOSPITAL ENCOUNTER (OUTPATIENT)
Dept: GENERAL RADIOLOGY | Facility: HOSPITAL | Age: 38
Discharge: HOME | End: 2018-07-30
Attending: EMERGENCY MEDICINE

## 2018-07-30 DIAGNOSIS — R52 PAIN: Primary | ICD-10-CM

## 2018-07-30 DIAGNOSIS — R52 PAIN: ICD-10-CM

## 2018-07-30 PROCEDURE — 73610 X-RAY EXAM OF ANKLE: CPT | Performed by: EMERGENCY MEDICINE

## 2019-06-12 ENCOUNTER — OFFICE VISIT (OUTPATIENT)
Dept: FAMILY MEDICINE CLINIC | Facility: CLINIC | Age: 39
End: 2019-06-12
Payer: COMMERCIAL

## 2019-06-12 VITALS
HEIGHT: 62 IN | SYSTOLIC BLOOD PRESSURE: 120 MMHG | DIASTOLIC BLOOD PRESSURE: 72 MMHG | OXYGEN SATURATION: 98 % | HEART RATE: 77 BPM | WEIGHT: 138 LBS | TEMPERATURE: 98 F | BODY MASS INDEX: 25.4 KG/M2

## 2019-06-12 DIAGNOSIS — J02.9 SORE THROAT: Primary | ICD-10-CM

## 2019-06-12 PROCEDURE — 87880 STREP A ASSAY W/OPTIC: CPT | Performed by: NURSE PRACTITIONER

## 2019-06-12 PROCEDURE — 99213 OFFICE O/P EST LOW 20 MIN: CPT | Performed by: NURSE PRACTITIONER

## 2019-06-12 PROCEDURE — 87081 CULTURE SCREEN ONLY: CPT | Performed by: NURSE PRACTITIONER

## 2019-06-12 RX ORDER — PHENTERMINE HYDROCHLORIDE 37.5 MG/1
37.5 TABLET ORAL
Refills: 1 | COMMUNITY
Start: 2019-03-28 | End: 2020-09-16

## 2019-06-12 NOTE — PROGRESS NOTES
CHIEF COMPLAINT:   Patient presents with:  Sore Throat: headache, ear ache x 2 dys         HPI:   Edmercedes Cheema is a 34year old female presents to clinic with complaint of sore throat. Patient has had for 2 days.  Intermittent dull HA 6/10 and left ear d /72   Pulse 77   Temp 97.9 °F (36.6 °C) (Oral)   Ht 62\"   Wt 138 lb   SpO2 98%   BMI 25.24 kg/m²   GENERAL: well developed, well nourished,in no apparent distress  SKIN: no rashes,no suspicious lesions  HEAD: atraumatic, normocephalic  EYES: conjunc Sore throats happen for many reasons, such as colds, allergies, and infections caused by viruses or bacteria. In any case, your throat becomes red and sore.  Your goal for self-care is to reduce your discomfort while giving your throat a chance to heal.  Mo · A temperature over 101°F (38.3°C)  · White spots on the throat  · Great difficulty swallowing  · Trouble breathing  · A skin rash  · Recent exposure to someone else with strep bacteria  · Severe hoarseness and swollen glands in the neck or jaw   Date Las

## 2019-07-29 ENCOUNTER — OFFICE VISIT (OUTPATIENT)
Dept: INTERNAL MEDICINE CLINIC | Facility: CLINIC | Age: 39
End: 2019-07-29
Payer: COMMERCIAL

## 2019-07-29 VITALS
WEIGHT: 143.81 LBS | OXYGEN SATURATION: 98 % | HEIGHT: 62 IN | SYSTOLIC BLOOD PRESSURE: 130 MMHG | DIASTOLIC BLOOD PRESSURE: 80 MMHG | HEART RATE: 83 BPM | BODY MASS INDEX: 26.46 KG/M2 | TEMPERATURE: 98 F

## 2019-07-29 DIAGNOSIS — Z12.39 SCREENING FOR BREAST CANCER: ICD-10-CM

## 2019-07-29 DIAGNOSIS — Z00.00 ANNUAL PHYSICAL EXAM: Primary | ICD-10-CM

## 2019-07-29 DIAGNOSIS — G47.00 INSOMNIA, UNSPECIFIED TYPE: ICD-10-CM

## 2019-07-29 DIAGNOSIS — F41.9 ANXIETY: ICD-10-CM

## 2019-07-29 DIAGNOSIS — E78.5 DYSLIPIDEMIA: ICD-10-CM

## 2019-07-29 PROCEDURE — 99396 PREV VISIT EST AGE 40-64: CPT | Performed by: INTERNAL MEDICINE

## 2019-07-29 RX ORDER — ZOLPIDEM TARTRATE 5 MG/1
5 TABLET ORAL NIGHTLY PRN
Qty: 30 TABLET | Refills: 0 | Status: SHIPPED | OUTPATIENT
Start: 2019-07-29 | End: 2020-02-25

## 2019-09-11 ENCOUNTER — HOSPITAL ENCOUNTER (OUTPATIENT)
Dept: MAMMOGRAPHY | Facility: HOSPITAL | Age: 39
Discharge: HOME | End: 2019-09-11
Attending: INTERNAL MEDICINE
Payer: COMMERCIAL

## 2019-09-11 DIAGNOSIS — Z12.39 SCREENING FOR BREAST CANCER: ICD-10-CM

## 2019-09-11 PROCEDURE — 77063 BREAST TOMOSYNTHESIS BI: CPT | Performed by: INTERNAL MEDICINE

## 2019-09-11 PROCEDURE — 77067 SCR MAMMO BI INCL CAD: CPT | Performed by: INTERNAL MEDICINE

## 2019-09-17 ENCOUNTER — HOSPITAL ENCOUNTER (OUTPATIENT)
Dept: ULTRASOUND IMAGING | Facility: HOSPITAL | Age: 39
Discharge: HOME | End: 2019-09-17
Attending: INTERNAL MEDICINE
Payer: COMMERCIAL

## 2019-09-17 ENCOUNTER — HOSPITAL ENCOUNTER (OUTPATIENT)
Dept: MAMMOGRAPHY | Facility: HOSPITAL | Age: 39
Discharge: HOME | End: 2019-09-17
Attending: INTERNAL MEDICINE
Payer: COMMERCIAL

## 2019-09-17 DIAGNOSIS — R92.8 ABNORMAL MAMMOGRAM: ICD-10-CM

## 2019-09-17 PROCEDURE — 77061 BREAST TOMOSYNTHESIS UNI: CPT | Performed by: INTERNAL MEDICINE

## 2019-09-17 PROCEDURE — 77065 DX MAMMO INCL CAD UNI: CPT | Performed by: INTERNAL MEDICINE

## 2019-09-17 PROCEDURE — 76642 ULTRASOUND BREAST LIMITED: CPT | Performed by: INTERNAL MEDICINE

## 2019-09-18 ENCOUNTER — OFFICE VISIT (OUTPATIENT)
Dept: OBGYN CLINIC | Facility: CLINIC | Age: 39
End: 2019-09-18
Payer: COMMERCIAL

## 2019-09-18 VITALS
DIASTOLIC BLOOD PRESSURE: 72 MMHG | SYSTOLIC BLOOD PRESSURE: 113 MMHG | HEART RATE: 90 BPM | HEIGHT: 61.5 IN | BODY MASS INDEX: 26.5 KG/M2 | WEIGHT: 142.19 LBS

## 2019-09-18 DIAGNOSIS — R92.8 ABNORMAL MAMMOGRAM OF LEFT BREAST: ICD-10-CM

## 2019-09-18 DIAGNOSIS — N95.1 PERIMENOPAUSAL SYMPTOMS: Primary | ICD-10-CM

## 2019-09-18 PROCEDURE — 99203 OFFICE O/P NEW LOW 30 MIN: CPT | Performed by: OBSTETRICS & GYNECOLOGY

## 2019-09-30 NOTE — PROGRESS NOTES
HPI:    Patient ID: Srinivasan Michael is a 34year old female. HPI   with menses q 28d / 5d / normal. No intermenstrual or PC bleeding. Vas for [facility]. ROS significant for some OTIS and insomnia. She does [employer] 3-4 / week.  Kids now 24- at [employer] Comment:GI distress  [place], [place]               Runny nose  Latex                   HIVES   PHYSICAL EXAM:   Physical Exam   Constitutional: She appears well-developed and well-nourished. No distress.    Genitourinary:   Genitourinary Comments: EG, vagina

## 2019-11-06 ENCOUNTER — E-VISIT (OUTPATIENT)
Dept: FAMILY MEDICINE CLINIC | Facility: CLINIC | Age: 39
End: 2019-11-06

## 2019-11-06 DIAGNOSIS — R39.9 URINARY TRACT INFECTION SYMPTOMS: Primary | ICD-10-CM

## 2019-11-06 PROCEDURE — 98969 ONLINE SERVICE BY HC PRO: CPT | Performed by: NURSE PRACTITIONER

## 2019-11-06 RX ORDER — SULFAMETHOXAZOLE AND TRIMETHOPRIM 800; 160 MG/1; MG/1
1 TABLET ORAL 2 TIMES DAILY
Qty: 6 TABLET | Refills: 0 | Status: SHIPPED | OUTPATIENT
Start: 2019-11-06 | End: 2019-11-09

## 2019-11-06 NOTE — PROGRESS NOTES
Lilli Meléndez is a 34year old female. HPI:   See answers to questions above.      Current Outpatient Medications   Medication Sig Dispense Refill   • Sulfamethoxazole-TMP -160 MG Oral Tab per tablet Take 1 tablet by mouth 2 (two) times daily for 3 d LAMINECTOMY POST LAT INTERBODY FUS 1 LEVEL N/A 4/16/2018    Performed by Candelario Bradley MD at [facility] MAIN OR   • SINUS SURGERY        septoplasty 2009      Family History   Problem Relation Age of Onset   • Asthma Mother    • Lipids Mother 54   • Asthma Broth

## 2019-12-02 ENCOUNTER — OFFICE VISIT (OUTPATIENT)
Dept: ALLERGY | Facility: CLINIC | Age: 39
End: 2019-12-02
Payer: COMMERCIAL

## 2019-12-02 VITALS
TEMPERATURE: 98 F | RESPIRATION RATE: 17 BRPM | DIASTOLIC BLOOD PRESSURE: 85 MMHG | SYSTOLIC BLOOD PRESSURE: 128 MMHG | OXYGEN SATURATION: 97 % | HEART RATE: 82 BPM

## 2019-12-02 DIAGNOSIS — J30.9 ALLERGIC RHINITIS, UNSPECIFIED SEASONALITY, UNSPECIFIED TRIGGER: ICD-10-CM

## 2019-12-02 DIAGNOSIS — L20.9 ATOPIC DERMATITIS, UNSPECIFIED TYPE: ICD-10-CM

## 2019-12-02 DIAGNOSIS — J45.990 EXERCISE-INDUCED ASTHMA: Primary | ICD-10-CM

## 2019-12-02 PROCEDURE — 99214 OFFICE O/P EST MOD 30 MIN: CPT | Performed by: ALLERGY & IMMUNOLOGY

## 2019-12-02 RX ORDER — ALBUTEROL SULFATE 90 UG/1
2 AEROSOL, METERED RESPIRATORY (INHALATION) EVERY 6 HOURS PRN
Qty: 1 INHALER | Refills: 0 | Status: SHIPPED | OUTPATIENT
Start: 2019-12-02 | End: 2021-02-26

## 2019-12-02 RX ORDER — ALBUTEROL SULFATE 90 UG/1
2 AEROSOL, METERED RESPIRATORY (INHALATION) EVERY 6 HOURS PRN
Qty: 1 INHALER | Refills: 0 | Status: SHIPPED | OUTPATIENT
Start: 2019-12-02 | End: 2021-07-08

## 2019-12-02 RX ORDER — MONTELUKAST SODIUM 10 MG/1
10 TABLET ORAL NIGHTLY
Qty: 30 TABLET | Refills: 0 | Status: SHIPPED | OUTPATIENT
Start: 2019-12-02 | End: 2021-02-26

## 2019-12-02 NOTE — PROGRESS NOTES
Ja Ramirez is a 37year old female. HPI:   Patient presents with:  Asthma: Pt reports her asthma has been flaring up over the last month. She has been taking Xyzal daily, proair, but its old.      Patient is a 57-year-old female who presents for malinao MONITORING N/A 4/16/2018    Performed by Abby Jean MD at [facility] MAIN OR   • LEG/ANKLE SURGERY PROC UNLISTED Right 3/2015    Ligament repair.     • LUMBAR LAMINECTOMY POST LAT INTERBODY FUS 1 LEVEL N/A 4/16/2018    Performed by Abby Jean MD at [address] [facility] spray by Nasal route daily. 1 Bottle 5   • Phentermine HCl 37.5 MG Oral Tab Take 37.5 mg by mouth once daily. 1   • Sertraline HCl 25 MG Oral Tab Take 1 tablet (25 mg total) by mouth daily.  30 tablet 3       Allergies:    Lactose                     Comme exercise and every 4-6 hours as needed. May add Singulair 10 mg once a day if refractory to albuterol  Flu vaccine up-to-date    2. Ad  Mild at this time. Continue with moisturizers twice a day. Consider Vanicream as a hypoallergenic moisturizer.   Zee harrison

## 2019-12-03 NOTE — PATIENT INSTRUCTIONS
1.  Exercise-induced asthma  Handouts on asthma and common triggers provided and reviewed  Recommended trial of albuterol 2 puffs 15 minutes prior to exercise and every 4-6 hours as needed.   May add Singulair 10 mg once a day if refractory to albuterol  Fl

## 2020-02-25 ENCOUNTER — TELEPHONE (OUTPATIENT)
Dept: INTERNAL MEDICINE CLINIC | Facility: CLINIC | Age: 40
End: 2020-02-25

## 2020-02-25 RX ORDER — ZOLPIDEM TARTRATE 5 MG/1
5 TABLET ORAL NIGHTLY PRN
Qty: 30 TABLET | Refills: 0 | Status: SHIPPED | OUTPATIENT
Start: 2020-02-25 | End: 2020-08-24

## 2020-02-26 RX ORDER — ZOLPIDEM TARTRATE 5 MG/1
TABLET ORAL
Qty: 30 TABLET | Refills: 0 | OUTPATIENT
Start: 2020-02-26

## 2020-02-26 NOTE — TELEPHONE ENCOUNTER
Current refill request refused due to refill is either a duplicate request or has active refills at the pharmacy. Check previous templates.     Requested Prescriptions     Refused Prescriptions Disp Refills   • ZOLPIDEM TARTRATE 5 MG Oral Tab [Pharmacy Med

## 2020-03-04 ENCOUNTER — TELEPHONE (OUTPATIENT)
Dept: INTERNAL MEDICINE CLINIC | Facility: CLINIC | Age: 40
End: 2020-03-04

## 2020-04-09 RX ORDER — LEVOCETIRIZINE DIHYDROCHLORIDE 5 MG/1
5 TABLET, FILM COATED ORAL NIGHTLY
Qty: 90 TABLET | Refills: 1 | Status: SHIPPED | OUTPATIENT
Start: 2020-04-09

## 2020-04-09 NOTE — TELEPHONE ENCOUNTER
Refill requested for Xyzal     Last office visit: 12/2/2019     Previously advised to follow up in no timeline listed in last office visit    F/U currently scheduled? No    ACTION: Routed to Dr. Flavio Almazan for review.

## 2020-04-24 ENCOUNTER — TELEPHONE (OUTPATIENT)
Dept: INTERNAL MEDICINE CLINIC | Facility: CLINIC | Age: 40
End: 2020-04-24

## 2020-04-24 DIAGNOSIS — G47.00 INSOMNIA, UNSPECIFIED TYPE: Primary | ICD-10-CM

## 2020-04-24 PROCEDURE — 99212 OFFICE O/P EST SF 10 MIN: CPT | Performed by: INTERNAL MEDICINE

## 2020-04-24 NOTE — TELEPHONE ENCOUNTER
Virtual Telephone Check-In    Lilli Meléndez verbally consents to a Virtual/Telephone Check-In visit on 04/24/20. Patient understands and accepts financial responsibility for any deductible, co-insurance and/or co-pays associated with this service.     Du

## 2020-07-16 ENCOUNTER — TELEPHONE (OUTPATIENT)
Dept: INTERNAL MEDICINE CLINIC | Facility: CLINIC | Age: 40
End: 2020-07-16

## 2020-07-16 DIAGNOSIS — Z00.00 ANNUAL PHYSICAL EXAM: Primary | ICD-10-CM

## 2020-07-16 DIAGNOSIS — R92.8 ABNORMAL MAMMOGRAM OF LEFT BREAST: ICD-10-CM

## 2020-07-16 DIAGNOSIS — N95.1 PERIMENOPAUSAL SYMPTOMS: ICD-10-CM

## 2020-07-16 DIAGNOSIS — R92.2 DENSE BREAST: ICD-10-CM

## 2020-07-16 NOTE — TELEPHONE ENCOUNTER
Please call patient--she was supposed to have follow up imaging on her left breast a few months ago--please have her call and schedule this ASAP

## 2020-07-29 NOTE — TELEPHONE ENCOUNTER
Spoke with Jerry Zaidi; She wants to do everything in Oct of 2020 once her kids are back in school. She asked for updated orders for blood, Mammo and will call back to schedule Oct Annual Physical.     Provided her with Central Scheduling's #. Pended Orders.

## 2020-08-10 ENCOUNTER — TELEPHONE (OUTPATIENT)
Dept: INTERNAL MEDICINE CLINIC | Facility: CLINIC | Age: 40
End: 2020-08-10

## 2020-08-10 NOTE — TELEPHONE ENCOUNTER
Pt. Has a medication question she was taking [place] and she is not falling asleep and is completely exhausted the next day please advise ph.  # [phone]   Routed to clinical

## 2020-08-11 NOTE — TELEPHONE ENCOUNTER
Patient called and relayed Dr Vonnie Marin message. Pt states she has appt with Dr Cj Fernandez on 9/16/20 and will just talk to him then. Pt refused to make appt sooner to see Dr Dianne Salazar regarding sleep issues.

## 2020-08-11 NOTE — TELEPHONE ENCOUNTER
Message noted. We may need to see to discuss sleep issues and neck steps. Difficult to prescribe sleeping medications over the phone in Dr. Elsi Dorman absence. Cigarettes

## 2020-08-14 ENCOUNTER — HOSPITAL ENCOUNTER (OUTPATIENT)
Age: 40
Discharge: HOME | End: 2020-08-14
Attending: EMERGENCY MEDICINE
Payer: COMMERCIAL

## 2020-08-14 ENCOUNTER — TELEPHONE (OUTPATIENT)
Dept: INTERNAL MEDICINE CLINIC | Facility: CLINIC | Age: 40
End: 2020-08-14

## 2020-08-14 VITALS
DIASTOLIC BLOOD PRESSURE: 86 MMHG | HEART RATE: 69 BPM | RESPIRATION RATE: 16 BRPM | SYSTOLIC BLOOD PRESSURE: 118 MMHG | OXYGEN SATURATION: 100 % | TEMPERATURE: 98 F | BODY MASS INDEX: 26.87 KG/M2 | HEIGHT: 62 IN | WEIGHT: 146 LBS

## 2020-08-14 DIAGNOSIS — J02.9 ACUTE VIRAL PHARYNGITIS: Primary | ICD-10-CM

## 2020-08-14 DIAGNOSIS — Z20.822 ENCOUNTER FOR LABORATORY TESTING FOR COVID-19 VIRUS: ICD-10-CM

## 2020-08-14 PROCEDURE — 99202 OFFICE O/P NEW SF 15 MIN: CPT | Performed by: EMERGENCY MEDICINE

## 2020-08-14 PROCEDURE — U0003 INFECTIOUS AGENT DETECTION BY NUCLEIC ACID (DNA OR RNA); SEVERE ACUTE RESPIRATORY SYNDROME CORONAVIRUS 2 (SARS-COV-2) (CORONAVIRUS DISEASE [COVID-19]), AMPLIFIED PROBE TECHNIQUE, MAKING USE OF HIGH THROUGHPUT TECHNOLOGIES AS DESCRIBED BY CMS-2020-01-R: HCPCS | Performed by: EMERGENCY MEDICINE

## 2020-08-14 NOTE — ED PROVIDER NOTES
Patient Seen in: [facility] In [address] [place] [address]      History   Patient presents with:  Sore Throat    Stated Complaint: Sore Throat    HPI  Patient is here with her son.   They both had a postnasal drip and scratchy throat for the last sever Rhythm: Normal rate and regular rhythm. Heart sounds: Normal heart sounds. Pulmonary:      Effort: Pulmonary effort is normal.      Breath sounds: Normal breath sounds. Abdominal:      Palpations: Abdomen is soft. Tenderness:  There is no tend

## 2020-08-14 NOTE — TELEPHONE ENCOUNTER
Pt. Son has a cold, runny nose and cough. She has a sore throat and wants to know if she cn get tested for COVID19 please advise ph.  # [phone]   Routed high to clinical

## 2020-08-15 LAB — SARS-COV-2 RNA RESP QL NAA+PROBE: NOT DETECTED

## 2020-08-24 RX ORDER — ZOLPIDEM TARTRATE 5 MG/1
5 TABLET ORAL NIGHTLY PRN
Qty: 30 TABLET | Refills: 0 | Status: SHIPPED | OUTPATIENT
Start: 2020-08-24 | End: 2020-09-16

## 2020-08-24 NOTE — TELEPHONE ENCOUNTER
Nursing, let her know that I did refill her medications/Ambien, and she should consider making an appointment with 1 of us here for a yearly physical, looks like she is just about to

## 2020-08-24 NOTE — TELEPHONE ENCOUNTER
Patient has an appointment with Dr Wayne Ross in October, patient is requesting a refill to last her until her appointment.

## 2020-08-24 NOTE — TELEPHONE ENCOUNTER
To MD:  The above refill request is for a controlled substance. Please review pended medication order.    Print and sign for staff to fax to pharmacy or prescribe electronically    For Dr. Sarah Mueller     Last refilled 2/25/2020  #30/0

## 2020-09-16 ENCOUNTER — MED REC SCAN ONLY (OUTPATIENT)
Dept: INTERNAL MEDICINE CLINIC | Facility: CLINIC | Age: 40
End: 2020-09-16

## 2020-09-16 ENCOUNTER — OFFICE VISIT (OUTPATIENT)
Dept: INTERNAL MEDICINE CLINIC | Facility: CLINIC | Age: 40
End: 2020-09-16
Payer: COMMERCIAL

## 2020-09-16 VITALS
WEIGHT: 145 LBS | HEART RATE: 89 BPM | TEMPERATURE: 97 F | DIASTOLIC BLOOD PRESSURE: 82 MMHG | OXYGEN SATURATION: 100 % | SYSTOLIC BLOOD PRESSURE: 112 MMHG | BODY MASS INDEX: 26.68 KG/M2 | HEIGHT: 62 IN

## 2020-09-16 DIAGNOSIS — Z00.00 ANNUAL PHYSICAL EXAM: Primary | ICD-10-CM

## 2020-09-16 DIAGNOSIS — Z23 NEEDS FLU SHOT: ICD-10-CM

## 2020-09-16 DIAGNOSIS — F41.9 ANXIETY: ICD-10-CM

## 2020-09-16 DIAGNOSIS — E78.5 DYSLIPIDEMIA: ICD-10-CM

## 2020-09-16 DIAGNOSIS — G47.00 INSOMNIA, UNSPECIFIED TYPE: ICD-10-CM

## 2020-09-16 PROCEDURE — 3008F BODY MASS INDEX DOCD: CPT | Performed by: INTERNAL MEDICINE

## 2020-09-16 PROCEDURE — 3079F DIAST BP 80-89 MM HG: CPT | Performed by: INTERNAL MEDICINE

## 2020-09-16 PROCEDURE — 3074F SYST BP LT 130 MM HG: CPT | Performed by: INTERNAL MEDICINE

## 2020-09-16 PROCEDURE — 90686 IIV4 VACC NO PRSV 0.5 ML IM: CPT | Performed by: INTERNAL MEDICINE

## 2020-09-16 PROCEDURE — 99396 PREV VISIT EST AGE 40-64: CPT | Performed by: INTERNAL MEDICINE

## 2020-09-16 PROCEDURE — 90471 IMMUNIZATION ADMIN: CPT | Performed by: INTERNAL MEDICINE

## 2020-09-16 RX ORDER — ZOLPIDEM TARTRATE 5 MG/1
5 TABLET ORAL NIGHTLY PRN
Qty: 30 TABLET | Refills: 5 | Status: SHIPPED | OUTPATIENT
Start: 2020-09-16 | End: 2021-04-07

## 2020-09-16 RX ORDER — SERTRALINE HYDROCHLORIDE 25 MG/1
25 TABLET, FILM COATED ORAL DAILY
Qty: 30 TABLET | Refills: 5 | Status: SHIPPED | OUTPATIENT
Start: 2020-09-16 | End: 2021-07-08

## 2020-09-16 NOTE — PROGRESS NOTES
Amarjit Cramer is a 37year old female. HPI:   Patient presents with:  Physical: Here today for annual physical; Last (normal) PAP - Nov '17. Pt would like to discuss medications - Zoloft & Ambien.       38 y/o F here for physical exam; anxiety has been s 48        uterine   • Dementia Maternal Grandmother 79   • Cancer Maternal Grandfather         endometrial      Social History: Social History    Tobacco Use      Smoking status: Never Smoker      Smokeless tobacco: Never Used    Alcohol use: Yes      Comm and polyphagia  Gastrointestinal:  Negative for abdominal pain, constipation, decreased appetite, diarrhea and vomiting; no melena or hematochezia  Musculoskeletal:  Negative for arthralgias or myalgias  Genitourinary:  Negative for dysuria or polyuria  He zolpidem 5 MG Oral Tab 30 tablet 5     Sig: Take 1 tablet (5 mg total) by mouth nightly as needed for Sleep. • Sertraline HCl 25 MG Oral Tab 30 tablet 5     Sig: Take 1 tablet (25 mg total) by mouth daily.        Imaging & Referrals:  [facility] INFLUENZA [facility]

## 2020-09-17 ENCOUNTER — TELEPHONE (OUTPATIENT)
Dept: INTERNAL MEDICINE CLINIC | Facility: CLINIC | Age: 40
End: 2020-09-17

## 2020-09-17 ENCOUNTER — HOSPITAL ENCOUNTER (OUTPATIENT)
Dept: MAMMOGRAPHY | Facility: HOSPITAL | Age: 40
Discharge: HOME | End: 2020-09-17
Attending: INTERNAL MEDICINE
Payer: COMMERCIAL

## 2020-09-17 ENCOUNTER — HOSPITAL ENCOUNTER (OUTPATIENT)
Dept: MAMMOGRAPHY | Facility: HOSPITAL | Age: 40
End: 2020-09-17
Attending: INTERNAL MEDICINE
Payer: COMMERCIAL

## 2020-09-17 DIAGNOSIS — R92.8 ABNORMAL MAMMOGRAM OF LEFT BREAST: ICD-10-CM

## 2020-09-17 DIAGNOSIS — R92.2 DENSE BREAST: ICD-10-CM

## 2020-09-17 PROCEDURE — 77062 BREAST TOMOSYNTHESIS BI: CPT | Performed by: INTERNAL MEDICINE

## 2020-09-17 PROCEDURE — 77066 DX MAMMO INCL CAD BI: CPT | Performed by: INTERNAL MEDICINE

## 2020-09-17 NOTE — TELEPHONE ENCOUNTER
I reviewed the most recent diagnostic mammogram dated for 9/17/2020. Please kindly notify the patient that there is no evidence of suspicious findings in either breast.  Patient can resume screening mammograms every 12 months.

## 2020-09-22 ENCOUNTER — TELEPHONE (OUTPATIENT)
Dept: INTERNAL MEDICINE CLINIC | Facility: CLINIC | Age: 40
End: 2020-09-22

## 2020-09-22 DIAGNOSIS — M79.10 MYALGIA: Primary | ICD-10-CM

## 2020-09-22 NOTE — TELEPHONE ENCOUNTER
To Dr. Benitez Files - pt feels crummy,  has same sx. C/o fatigue/headache/chills/bodyaches/nausea. Diarrhea onset yesterday - x 3  Denies restaurant food yesterday.     Respiratory infection triage:    Fever:  [x]  No fever  []  Fever>100.4    Cough:  []

## 2020-09-22 NOTE — TELEPHONE ENCOUNTER
Pt. Is having chills, no fever, body aches, bad diarrhea and fatigue she would like an order for a COVID19 test ph.  # [phone]   Routed high to clinical

## 2020-09-23 ENCOUNTER — APPOINTMENT (OUTPATIENT)
Dept: LAB | Facility: HOSPITAL | Age: 40
End: 2020-09-23
Attending: INTERNAL MEDICINE
Payer: COMMERCIAL

## 2020-09-23 DIAGNOSIS — M79.10 MYALGIA: ICD-10-CM

## 2020-09-25 LAB — SARS-COV-2 RNA RESP QL NAA+PROBE: DETECTED

## 2020-09-28 ENCOUNTER — TELEPHONE (OUTPATIENT)
Dept: INTERNAL MEDICINE CLINIC | Facility: CLINIC | Age: 40
End: 2020-09-28

## 2020-09-28 NOTE — TELEPHONE ENCOUNTER
----- Message from Jamey Mittal MD sent at 9/28/2020  8:04 AM CDT -----  COVID-19 testing is positive. Therefore patient needs to remain in self quarantine for 14 days from when test was obtained on September 25.  has tested negative.   However

## 2020-10-19 NOTE — TELEPHONE ENCOUNTER
To nursing,   Dr. Della Lua saw patient 9/16/2020 and on that day sent a prescription to [facility] in [place] for zolpidem 5 mg HS prn #30 with 5 refills. Please call the pharmacy and tell them she should have 5 refills remaining.   May need to check with

## 2020-10-19 NOTE — TELEPHONE ENCOUNTER
To MD:  The above refill request is for a controlled substance. Please review pended medication order. Print and sign for staff to fax to pharmacy or prescribe electronically.     For Dr. Gladys Cadet  8/24/20 #30     Last refilled- 9/16/2020 #30/5

## 2020-10-21 RX ORDER — ZOLPIDEM TARTRATE 5 MG/1
5 TABLET ORAL NIGHTLY PRN
Qty: 30 TABLET | Refills: 5 | OUTPATIENT
Start: 2020-10-21

## 2020-10-21 NOTE — TELEPHONE ENCOUNTER
Current refill request refused due to refill is either a duplicate request or has active refills at the pharmacy. Check previous templates.     Requested Prescriptions     Refused Prescriptions Disp Refills   • zolpidem 5 MG Oral Tab 30 tablet 5     Sig: T

## 2021-02-26 RX ORDER — MONTELUKAST SODIUM 10 MG/1
TABLET ORAL
Qty: 30 TABLET | Refills: 0 | Status: SHIPPED | OUTPATIENT
Start: 2021-02-26

## 2021-02-26 RX ORDER — ALBUTEROL SULFATE 90 UG/1
2 AEROSOL, METERED RESPIRATORY (INHALATION) EVERY 6 HOURS PRN
Qty: 1 INHALER | Refills: 0 | Status: CANCELLED | OUTPATIENT
Start: 2021-02-26

## 2021-02-26 NOTE — TELEPHONE ENCOUNTER
Refill requested for:   Name from pharmacy: [address] [place] [address] (200  PFS) 8.5G         Will file in chart as: PROAIR  (90 Base) MCG/ACT Inhalation Aero Soln     Possible duplicate: Hojuliana to review recent actions on this medication    Sig: INHALE 2

## 2021-02-26 NOTE — TELEPHONE ENCOUNTER
Dr. Lilly Love has historically prescribed. Advised patient to call Dr. Lilly Love for refill; she verbalized understanding.

## 2021-02-26 NOTE — TELEPHONE ENCOUNTER
Medication refilled x1 month. Please call to schedule yearly follow-up.   Patient last seen in December 2019 and due for yearly follow-up

## 2021-03-01 NOTE — TELEPHONE ENCOUNTER
Spoke with patient. Verified name and . Informed patient refill for Singulair and ProAir  Inhaler has been refilled per Dr. Marika Boyd and per Dr. Marika Boyd will need an office visit for further refills as last office visit was 2019.  Patient verbalizes unders

## 2021-04-07 ENCOUNTER — TELEPHONE (OUTPATIENT)
Dept: INTERNAL MEDICINE CLINIC | Facility: CLINIC | Age: 41
End: 2021-04-07

## 2021-04-07 RX ORDER — ZOLPIDEM TARTRATE 5 MG/1
5 TABLET ORAL NIGHTLY PRN
Qty: 30 TABLET | Refills: 5 | Status: SHIPPED | OUTPATIENT
Start: 2021-04-07 | End: 2021-07-08

## 2021-04-07 NOTE — TELEPHONE ENCOUNTER
Zolpidem refill pended to Dr. Hernan Rojo. This was last refilled on 9/16/20 for #30 with 5 additional refills.

## 2021-05-13 NOTE — PROGRESS NOTES
Chief Complaint:   Patient presents with:  Checkup: c/o left earache [onset 5/12];  Hx of Allergies    HPI:     Ms. Leonidas Soliz is a 41year old female past medical history of dyslipidemia, anxiety, insomnia, asthma, allergic rhinitis who presents today for an ear Allergies Brother    • Cancer Father         NSCLC stage IV, nonsmoker, asbestos exposrure?    • Stroke Paternal Grandfather 48        smoker   • Cancer Paternal Grandmother         uterine   • Cancer Paternal Aunt 48        uterine   • Dementia Maternal Gr Difficulty  Eyes: Eye Pain, Swelling, Redness, Foreign Body, Discharge, Vision Changes  Cardiovascular: Chest Pain, SOB, PND, Dyspnea on Exertion, Orthopnea, Claudication, Edema, Palpitations  Respiratory: Cough, Sputum, Wheezing, Shortness of breath  Norah from 3/17/2018  –CMP within normal limits  CBC from 4/17/2018 within normal limits      ASSESSMENT AND PLAN:       Ms. Danni Fang is a 41year old female past medical history of dyslipidemia, anxiety, insomnia, asthma, allergic rhinitis who presents today for an

## 2021-05-14 ENCOUNTER — OFFICE VISIT (OUTPATIENT)
Dept: INTERNAL MEDICINE CLINIC | Facility: CLINIC | Age: 41
End: 2021-05-14
Payer: COMMERCIAL

## 2021-05-14 VITALS
HEIGHT: 62 IN | DIASTOLIC BLOOD PRESSURE: 74 MMHG | WEIGHT: 151.5 LBS | HEART RATE: 86 BPM | SYSTOLIC BLOOD PRESSURE: 110 MMHG | BODY MASS INDEX: 27.88 KG/M2 | OXYGEN SATURATION: 98 %

## 2021-05-14 DIAGNOSIS — J30.1 SEASONAL ALLERGIC RHINITIS DUE TO POLLEN: ICD-10-CM

## 2021-05-14 DIAGNOSIS — H65.192 OTHER NON-RECURRENT ACUTE NONSUPPURATIVE OTITIS MEDIA OF LEFT EAR: Primary | ICD-10-CM

## 2021-05-14 DIAGNOSIS — F41.9 ANXIETY: ICD-10-CM

## 2021-05-14 DIAGNOSIS — G47.00 INSOMNIA, UNSPECIFIED TYPE: ICD-10-CM

## 2021-05-14 PROCEDURE — 3074F SYST BP LT 130 MM HG: CPT | Performed by: INTERNAL MEDICINE

## 2021-05-14 PROCEDURE — 99214 OFFICE O/P EST MOD 30 MIN: CPT | Performed by: INTERNAL MEDICINE

## 2021-05-14 PROCEDURE — 3008F BODY MASS INDEX DOCD: CPT | Performed by: INTERNAL MEDICINE

## 2021-05-14 PROCEDURE — 3078F DIAST BP <80 MM HG: CPT | Performed by: INTERNAL MEDICINE

## 2021-05-14 RX ORDER — AMOXICILLIN AND CLAVULANATE POTASSIUM 875; 125 MG/1; MG/1
1 TABLET, FILM COATED ORAL 2 TIMES DAILY
Qty: 20 TABLET | Refills: 0 | Status: SHIPPED | OUTPATIENT
Start: 2021-05-14 | End: 2021-05-24

## 2021-05-14 NOTE — PATIENT INSTRUCTIONS
You are seen in clinic for left ear pain. Based on our clinical assessment, you have left middle ear infection called acute otitis media. This may be related to your seasonal allergies.  continue taking your allergy medications, you may need to follow-up

## 2021-05-28 ENCOUNTER — TELEPHONE (OUTPATIENT)
Dept: OBGYN CLINIC | Facility: CLINIC | Age: 41
End: 2021-05-28

## 2021-05-28 NOTE — TELEPHONE ENCOUNTER
Pt calling stating front right sided pain for the last few months. Pt states the pain is frontal lower abdomen \"where I believe the ovary would be\". Pt states increased pain with movement and  Inhalation at times. Pt states it is a tinging pain on palpation. Pt has also noted increased bloating. Pt states pain is intermittent and rates it a 3/10. Pt states she has not tried OTC pain relief. RN instructed pt to try OTC Tylenol or Ibuprofen per bottle instructions for pain relief. Pt also informed to reach out to PCP to r/o possible appendicitis. Pt stated this is not her appendix. Pt informed she is overdue for her annual, appt with ANSELMO offered on 7/2. Pt accepts appt. Pain precautions of 7/10 to go to ER, pt states understanding. Pt asked for message to be sent to ANSELMO for further recs if blood test or ultrasound testing before appt. To ANSELMO to please review and advise. Thank you.     Last appt was 09/18/2019 with [facility] for consult/gyn problem    Pt had annual with PCP on 9/16/2020, no breast exam noted   Last MAMMO: 9/17/2020 negative  Last Pap was 11/17/2017 PAP/HPV negative with PCP

## 2021-07-02 ENCOUNTER — OFFICE VISIT (OUTPATIENT)
Dept: OBGYN CLINIC | Facility: CLINIC | Age: 41
End: 2021-07-02
Payer: COMMERCIAL

## 2021-07-02 VITALS
DIASTOLIC BLOOD PRESSURE: 79 MMHG | SYSTOLIC BLOOD PRESSURE: 115 MMHG | HEART RATE: 91 BPM | WEIGHT: 151 LBS | BODY MASS INDEX: 28 KG/M2

## 2021-07-02 DIAGNOSIS — Z01.419 ENCOUNTER FOR GYNECOLOGICAL EXAMINATION WITHOUT ABNORMAL FINDING: Primary | ICD-10-CM

## 2021-07-02 DIAGNOSIS — Z12.4 SCREENING FOR MALIGNANT NEOPLASM OF CERVIX: ICD-10-CM

## 2021-07-02 DIAGNOSIS — Z12.31 SCREENING MAMMOGRAM, ENCOUNTER FOR: ICD-10-CM

## 2021-07-02 PROCEDURE — 3074F SYST BP LT 130 MM HG: CPT | Performed by: OBSTETRICS & GYNECOLOGY

## 2021-07-02 PROCEDURE — 3078F DIAST BP <80 MM HG: CPT | Performed by: OBSTETRICS & GYNECOLOGY

## 2021-07-02 PROCEDURE — 99396 PREV VISIT EST AGE 40-64: CPT | Performed by: OBSTETRICS & GYNECOLOGY

## 2021-07-02 RX ORDER — BETAMETHASONE DIPROPIONATE 0.05 %
OINTMENT (GRAM) TOPICAL
COMMUNITY
Start: 2021-01-14

## 2021-07-03 NOTE — PROGRESS NOTES
HPI/Subjective:   Patient ID: Rosalia Chamberlain is a 41year old female. HPI   with menses q 28d / 5d / normal and Vas for [facility]. No new family or personal medical issues but PGM and paternal aunt both with  endometrial CA.  Funmi Johnson had Covid in [place] HFA (PROAIR HFA) 108 (90 Base) MCG/ACT Inhalation Aero Soln Inhale 2 puffs into the lungs every 6 (six) hours as needed for Wheezing.  1 Inhaler 0   • Albuterol Sulfate HFA (PROAIR HFA) 108 (90 BASE) MCG/ACT Inhalation Aero Soln Inhale 2 puffs into the lung diagnosis)  Screening for malignant neoplasm of cervix  Screening mammogram, encounter for    Orders Placed This Encounter      Hpv Dna  High Risk , Thin Prep Collect      ThinPrep PAP Smear      THINPREP PAP SMEAR ONLY      Meds This Visit:  Requested Pre

## 2021-07-06 LAB — HPV I/H RISK 1 DNA SPEC QL NAA+PROBE: NEGATIVE

## 2021-07-07 NOTE — PROGRESS NOTES
oB Maldonado is a 41year old female. Patient presents with:  Physical: C/O Insomnia and SAD      HPI:   Bo Maldonado is a 41year old female who presents for her annual physical exam    In terms of past medical history, she has asthma but is much better Medications   Medication Sig Dispense Refill   • Sertraline HCl 25 MG Oral Tab Take 1 tablet (25 mg total) by mouth daily. 90 tablet 3   • zolpidem 5 MG Oral Tab Take 1 tablet (5 mg total) by mouth nightly as needed for Sleep.  30 tablet 5   • Betamethasone   2007    10/09/2001   • SINUS SURGERY        septoplasty 2009      Family History   Problem Relation Age of Onset   • Asthma Mother    • Lipids Mother 54   • Asthma Brother    • Allergies Brother    • Cancer Father         NSCLC stage IV, nonsmok auscultation  CARDIO: RRR, normal S1S2, no gallops or murmurs  GI: soft, NT, ND, NABS, no HSM  GYNE: deferred  EXTREMITIES: no cyanosis, clubbing or edema, +2 radial and DP pulses bilaterally      ASSESSMENT AND PLAN:     1.  Annual physical exam  Written B

## 2021-07-08 ENCOUNTER — OFFICE VISIT (OUTPATIENT)
Dept: INTERNAL MEDICINE CLINIC | Facility: CLINIC | Age: 41
End: 2021-07-08
Payer: COMMERCIAL

## 2021-07-08 VITALS
HEART RATE: 73 BPM | WEIGHT: 143.69 LBS | SYSTOLIC BLOOD PRESSURE: 112 MMHG | DIASTOLIC BLOOD PRESSURE: 72 MMHG | BODY MASS INDEX: 26.44 KG/M2 | HEIGHT: 62 IN | TEMPERATURE: 98 F | OXYGEN SATURATION: 99 %

## 2021-07-08 DIAGNOSIS — E78.5 DYSLIPIDEMIA: ICD-10-CM

## 2021-07-08 DIAGNOSIS — Z00.00 ANNUAL PHYSICAL EXAM: ICD-10-CM

## 2021-07-08 DIAGNOSIS — Z12.31 ENCOUNTER FOR SCREENING MAMMOGRAM FOR MALIGNANT NEOPLASM OF BREAST: Primary | ICD-10-CM

## 2021-07-08 DIAGNOSIS — F41.9 ANXIETY: ICD-10-CM

## 2021-07-08 PROCEDURE — 99396 PREV VISIT EST AGE 40-64: CPT | Performed by: INTERNAL MEDICINE

## 2021-07-08 PROCEDURE — 3008F BODY MASS INDEX DOCD: CPT | Performed by: INTERNAL MEDICINE

## 2021-07-08 PROCEDURE — 3074F SYST BP LT 130 MM HG: CPT | Performed by: INTERNAL MEDICINE

## 2021-07-08 PROCEDURE — 3078F DIAST BP <80 MM HG: CPT | Performed by: INTERNAL MEDICINE

## 2021-07-08 RX ORDER — SERTRALINE HYDROCHLORIDE 25 MG/1
25 TABLET, FILM COATED ORAL DAILY
Qty: 90 TABLET | Refills: 3 | Status: SHIPPED | OUTPATIENT
Start: 2021-07-08

## 2021-07-08 RX ORDER — ZOLPIDEM TARTRATE 5 MG/1
5 TABLET ORAL NIGHTLY PRN
Qty: 30 TABLET | Refills: 5 | Status: SHIPPED | OUTPATIENT
Start: 2021-07-08

## 2021-09-29 ENCOUNTER — TELEPHONE (OUTPATIENT)
Dept: INTERNAL MEDICINE CLINIC | Facility: CLINIC | Age: 41
End: 2021-09-29

## 2021-10-07 ENCOUNTER — HOSPITAL ENCOUNTER (OUTPATIENT)
Dept: MAMMOGRAPHY | Facility: HOSPITAL | Age: 41
Discharge: HOME | End: 2021-10-07
Attending: INTERNAL MEDICINE
Payer: COMMERCIAL

## 2021-10-07 DIAGNOSIS — Z12.31 ENCOUNTER FOR SCREENING MAMMOGRAM FOR MALIGNANT NEOPLASM OF BREAST: ICD-10-CM

## 2021-10-07 PROCEDURE — 77067 SCR MAMMO BI INCL CAD: CPT | Performed by: INTERNAL MEDICINE

## 2021-10-07 PROCEDURE — 77063 BREAST TOMOSYNTHESIS BI: CPT | Performed by: INTERNAL MEDICINE

## 2022-03-21 ENCOUNTER — TELEPHONE (OUTPATIENT)
Dept: INTERNAL MEDICINE CLINIC | Facility: CLINIC | Age: 42
End: 2022-03-21

## 2022-03-22 RX ORDER — ZOLPIDEM TARTRATE 5 MG/1
TABLET ORAL
Qty: 30 TABLET | Refills: 5 | Status: SHIPPED | OUTPATIENT
Start: 2022-03-22

## 2022-03-22 NOTE — TELEPHONE ENCOUNTER
To MD:  The above refill request is for a controlled substance. Please review pended medication order. Print and sign for staff to fax to pharmacy or prescribe electronically.     Last office visit: 7/8/2021  Last time refill sent and quantity/refills: 7/8/2021 #30 with 5 refills

## 2022-05-31 ENCOUNTER — E-VISIT (OUTPATIENT)
Dept: TELEHEALTH | Age: 42
End: 2022-05-31

## 2022-05-31 DIAGNOSIS — R39.9 UTI SYMPTOMS: Primary | ICD-10-CM

## 2022-05-31 PROCEDURE — 99421 OL DIG E/M SVC 5-10 MIN: CPT | Performed by: NURSE PRACTITIONER

## 2022-05-31 RX ORDER — NITROFURANTOIN 25; 75 MG/1; MG/1
100 CAPSULE ORAL 2 TIMES DAILY
Qty: 14 CAPSULE | Refills: 0 | Status: SHIPPED | OUTPATIENT
Start: 2022-05-31 | End: 2022-06-07

## 2022-07-12 ENCOUNTER — OFFICE VISIT (OUTPATIENT)
Dept: INTERNAL MEDICINE CLINIC | Facility: CLINIC | Age: 42
End: 2022-07-12
Payer: COMMERCIAL

## 2022-07-12 VITALS
OXYGEN SATURATION: 98 % | HEART RATE: 72 BPM | TEMPERATURE: 98 F | WEIGHT: 141 LBS | SYSTOLIC BLOOD PRESSURE: 100 MMHG | DIASTOLIC BLOOD PRESSURE: 80 MMHG | BODY MASS INDEX: 25.95 KG/M2 | HEIGHT: 62 IN

## 2022-07-12 DIAGNOSIS — N92.6 IRREGULAR PERIODS: ICD-10-CM

## 2022-07-12 DIAGNOSIS — F41.9 ANXIETY: ICD-10-CM

## 2022-07-12 DIAGNOSIS — R92.2 DENSE BREAST: ICD-10-CM

## 2022-07-12 DIAGNOSIS — Z00.00 ANNUAL PHYSICAL EXAM: Primary | ICD-10-CM

## 2022-07-12 DIAGNOSIS — J45.20 MILD INTERMITTENT ASTHMA WITHOUT COMPLICATION: ICD-10-CM

## 2022-07-12 DIAGNOSIS — G47.00 INSOMNIA, UNSPECIFIED TYPE: ICD-10-CM

## 2022-07-12 DIAGNOSIS — Z12.31 ENCOUNTER FOR SCREENING MAMMOGRAM FOR MALIGNANT NEOPLASM OF BREAST: ICD-10-CM

## 2022-07-12 DIAGNOSIS — E78.5 DYSLIPIDEMIA: ICD-10-CM

## 2022-07-12 PROCEDURE — 99396 PREV VISIT EST AGE 40-64: CPT | Performed by: INTERNAL MEDICINE

## 2022-07-12 PROCEDURE — 3079F DIAST BP 80-89 MM HG: CPT | Performed by: INTERNAL MEDICINE

## 2022-07-12 PROCEDURE — 3074F SYST BP LT 130 MM HG: CPT | Performed by: INTERNAL MEDICINE

## 2022-07-12 PROCEDURE — 3008F BODY MASS INDEX DOCD: CPT | Performed by: INTERNAL MEDICINE

## 2022-07-12 RX ORDER — ALBUTEROL SULFATE 90 UG/1
2 AEROSOL, METERED RESPIRATORY (INHALATION) EVERY 6 HOURS PRN
Qty: 2 EACH | Refills: 3 | Status: SHIPPED | OUTPATIENT
Start: 2022-07-12

## 2022-07-12 RX ORDER — SERTRALINE HYDROCHLORIDE 25 MG/1
25 TABLET, FILM COATED ORAL DAILY
Qty: 90 TABLET | Refills: 3 | Status: SHIPPED | OUTPATIENT
Start: 2022-07-12

## 2022-07-20 ENCOUNTER — OFFICE VISIT (OUTPATIENT)
Dept: OBGYN CLINIC | Facility: CLINIC | Age: 42
End: 2022-07-20
Payer: COMMERCIAL

## 2022-07-20 VITALS
BODY MASS INDEX: 26 KG/M2 | SYSTOLIC BLOOD PRESSURE: 112 MMHG | WEIGHT: 144 LBS | HEART RATE: 90 BPM | DIASTOLIC BLOOD PRESSURE: 80 MMHG

## 2022-07-20 DIAGNOSIS — D25.1 INTRAMURAL UTERINE FIBROID: ICD-10-CM

## 2022-07-20 DIAGNOSIS — N92.1 MENORRHAGIA WITH IRREGULAR CYCLE: Primary | ICD-10-CM

## 2022-07-20 PROCEDURE — 3074F SYST BP LT 130 MM HG: CPT | Performed by: OBSTETRICS & GYNECOLOGY

## 2022-07-20 PROCEDURE — 3079F DIAST BP 80-89 MM HG: CPT | Performed by: OBSTETRICS & GYNECOLOGY

## 2022-07-20 PROCEDURE — 99213 OFFICE O/P EST LOW 20 MIN: CPT | Performed by: OBSTETRICS & GYNECOLOGY

## 2022-08-19 ENCOUNTER — HOSPITAL ENCOUNTER (OUTPATIENT)
Dept: ULTRASOUND IMAGING | Facility: HOSPITAL | Age: 42
Discharge: HOME | End: 2022-08-19
Attending: OBSTETRICS & GYNECOLOGY
Payer: COMMERCIAL

## 2022-08-19 DIAGNOSIS — N92.1 MENORRHAGIA WITH IRREGULAR CYCLE: ICD-10-CM

## 2022-08-19 DIAGNOSIS — D25.1 INTRAMURAL UTERINE FIBROID: ICD-10-CM

## 2022-08-19 PROCEDURE — 76830 TRANSVAGINAL US NON-OB: CPT | Performed by: OBSTETRICS & GYNECOLOGY

## 2022-08-19 PROCEDURE — 76856 US EXAM PELVIC COMPLETE: CPT | Performed by: OBSTETRICS & GYNECOLOGY

## 2022-09-27 ENCOUNTER — PATIENT MESSAGE (OUTPATIENT)
Dept: OBGYN CLINIC | Facility: CLINIC | Age: 42
End: 2022-09-27

## 2022-09-27 ENCOUNTER — OFFICE VISIT (OUTPATIENT)
Dept: OBGYN CLINIC | Facility: CLINIC | Age: 42
End: 2022-09-27

## 2022-09-27 VITALS
SYSTOLIC BLOOD PRESSURE: 133 MMHG | BODY MASS INDEX: 26 KG/M2 | DIASTOLIC BLOOD PRESSURE: 86 MMHG | HEART RATE: 78 BPM | WEIGHT: 143 LBS

## 2022-09-27 DIAGNOSIS — Z80.49 FAMILY HISTORY OF UTERINE CANCER: ICD-10-CM

## 2022-09-27 DIAGNOSIS — N92.1 MENORRHAGIA WITH IRREGULAR CYCLE: ICD-10-CM

## 2022-09-27 DIAGNOSIS — Z01.411 ENCOUNTER FOR GYNECOLOGICAL EXAMINATION WITH ABNORMAL FINDING: Primary | ICD-10-CM

## 2022-09-27 PROCEDURE — 99396 PREV VISIT EST AGE 40-64: CPT | Performed by: OBSTETRICS & GYNECOLOGY

## 2022-09-27 PROCEDURE — 3079F DIAST BP 80-89 MM HG: CPT | Performed by: OBSTETRICS & GYNECOLOGY

## 2022-09-27 PROCEDURE — 3075F SYST BP GE 130 - 139MM HG: CPT | Performed by: OBSTETRICS & GYNECOLOGY

## 2022-09-27 NOTE — TELEPHONE ENCOUNTER
From: Aaliyah Dawson  To: Celeste Montejo. [id number] [facility],   Sent: 9/27/2022 2:49 PM CDT  Subject: Endometrial biopsy appintment    Hi Dr. Danelle Russ,      Following up from our visit today, 9/27, could we schedule that endometrial biopsy the week of Oct. 11? I am not anticipating a menstrual period during that week, so we should be good there. I will be out of town through the 10th, as might you be, but I should be back on the 11th.      Thank you,   Sarah Vo

## 2022-09-30 ENCOUNTER — TELEPHONE (OUTPATIENT)
Dept: OBGYN CLINIC | Facility: CLINIC | Age: 42
End: 2022-09-30

## 2022-09-30 PROBLEM — Z80.49 FAMILY HISTORY OF UTERINE CANCER: Status: ACTIVE | Noted: 2022-09-30

## 2022-09-30 NOTE — TELEPHONE ENCOUNTER
9/27 office note not complete. Pt requesting to schedule EMBX week of 10/11. To ANSELMO- please confirm that [facility] is needed.

## 2022-09-30 NOTE — TELEPHONE ENCOUNTER
Can I put Katy Poe in [facility] Oct 11th at 1 PM in the 2 OB slots for EB? She'll only need Motrin 600 mg pre-procedure.

## 2022-10-11 ENCOUNTER — HOSPITAL ENCOUNTER (OUTPATIENT)
Dept: MAMMOGRAPHY | Facility: HOSPITAL | Age: 42
Discharge: HOME | End: 2022-10-11
Attending: INTERNAL MEDICINE
Payer: COMMERCIAL

## 2022-10-11 DIAGNOSIS — R92.2 DENSE BREAST: ICD-10-CM

## 2022-10-11 DIAGNOSIS — Z12.31 ENCOUNTER FOR SCREENING MAMMOGRAM FOR MALIGNANT NEOPLASM OF BREAST: ICD-10-CM

## 2022-10-11 PROCEDURE — 77063 BREAST TOMOSYNTHESIS BI: CPT | Performed by: INTERNAL MEDICINE

## 2022-10-11 PROCEDURE — 77067 SCR MAMMO BI INCL CAD: CPT | Performed by: INTERNAL MEDICINE

## 2022-10-13 ENCOUNTER — OFFICE VISIT (OUTPATIENT)
Dept: OBGYN CLINIC | Facility: CLINIC | Age: 42
End: 2022-10-13
Payer: COMMERCIAL

## 2022-10-13 VITALS
SYSTOLIC BLOOD PRESSURE: 128 MMHG | WEIGHT: 143.19 LBS | HEART RATE: 86 BPM | DIASTOLIC BLOOD PRESSURE: 81 MMHG | BODY MASS INDEX: 26 KG/M2

## 2022-10-13 DIAGNOSIS — N92.1 MENORRHAGIA WITH IRREGULAR CYCLE: Primary | ICD-10-CM

## 2022-10-13 PROCEDURE — 3079F DIAST BP 80-89 MM HG: CPT | Performed by: OBSTETRICS & GYNECOLOGY

## 2022-10-13 PROCEDURE — 58100 BIOPSY OF UTERUS LINING: CPT | Performed by: OBSTETRICS & GYNECOLOGY

## 2022-10-13 PROCEDURE — 3074F SYST BP LT 130 MM HG: CPT | Performed by: OBSTETRICS & GYNECOLOGY

## 2022-10-13 RX ORDER — MELOXICAM 15 MG/1
15 TABLET ORAL DAILY
COMMUNITY
Start: 2022-04-19

## 2022-10-13 NOTE — PROGRESS NOTES
Endometrial Biopsy    Pre-Procedure Care:   Consent was obtained. Procedure/risks were explained. Questions were answered. Correct patient was identified. Correct side and site were confirmed. Pregnancy Results: n/a    Birth control method(s) used:  vasectomy    Pre-Medications: The patient was premedicated with Ibuprofen . Description of Procedure:  Under satisfactory analgesia, the patient was prepped and draped in the dorsal lithotomy position. A bivalve speculum was placed in the vagina and the cervix was prepped with Betadine solution. Single tooth tenaculum placed at the 12 o'clock position. The uterine cavity was sounded at 7 cm. The endometrial cavity was curetted for pipelle tissue sampling, 1 passes. Specimen was sent to pathology. The single tooth tenaculum was removed. Good hemostasis was noted. There were no complications. There was no blood loss. Discharge instructions were provided to the patient. Visit Plan:  Await final pathology prior to treatment.

## 2022-10-14 ENCOUNTER — PATIENT MESSAGE (OUTPATIENT)
Dept: INTERNAL MEDICINE CLINIC | Facility: CLINIC | Age: 42
End: 2022-10-14

## 2022-10-14 ENCOUNTER — TELEPHONE (OUTPATIENT)
Dept: INTERNAL MEDICINE CLINIC | Facility: CLINIC | Age: 42
End: 2022-10-14

## 2022-10-14 ENCOUNTER — TELEPHONE (OUTPATIENT)
Dept: OBGYN CLINIC | Facility: CLINIC | Age: 42
End: 2022-10-14

## 2022-10-14 DIAGNOSIS — E78.5 DYSLIPIDEMIA: Primary | ICD-10-CM

## 2022-10-14 DIAGNOSIS — R73.01 ABNORMAL FASTING GLUCOSE: ICD-10-CM

## 2022-10-14 NOTE — TELEPHONE ENCOUNTER
Patient with [facility] yesterday. Patient noted brown blood mixed with normal discharge today, concerned because it is more than yesterday. She woke up and placed a panty liner d/t brown bleeding. She is changing panty liner every 2-3 hours for hygiene, is not soaking through. Cramping is \"nagging,\" rated 2-3/10. Reassured pt this all sounds normal day after EMBX, body is still working out trapped blood from yesterday's procedure. As long as blood stays brown and becomes progressively lighter, okay to monitor. Can take motrin prn for cramping. Bleeding and pain precautions reviewed. Pt is appreciative. To ANSELMO as BRENT.

## 2022-10-15 ENCOUNTER — TELEPHONE (OUTPATIENT)
Dept: INTERNAL MEDICINE CLINIC | Facility: CLINIC | Age: 42
End: 2022-10-15

## 2022-10-17 NOTE — TELEPHONE ENCOUNTER
From: Adriana Moseley  Sent: 10/15/2022 10:13 AM CDT  To: Shaila Rivera Clinical Staff  Subject: results    One more thing I'm confused about. ..my fasting glucose value was normal...why would the hgb a1c be higher if that were the case? although I know that the fasting glucose is a snapshot of one value, but it's a little odd. Last year my a1c was 5.5% and this year it is 5.8% - is this a significant difference? My  who is prediabetic and is on metformin has an a1c of 5.7% and is considered in good control. I'm really confused by all this.

## 2022-10-17 NOTE — TELEPHONE ENCOUNTER
To MD:  The above refill request is for a controlled substance. Please review pended medication order. Print and sign for staff to fax to pharmacy or prescribe electronically.     Last office visit:7/12/2022    Last time refill sent and quantity/refills: 3/22/2022 #30/5   [place] 9[place]1/20[place] #30

## 2022-10-18 ENCOUNTER — TELEPHONE (OUTPATIENT)
Dept: OBGYN CLINIC | Facility: CLINIC | Age: 42
End: 2022-10-18

## 2022-10-18 DIAGNOSIS — Z80.9 FAMILY HISTORY OF CANCER: ICD-10-CM

## 2022-10-18 DIAGNOSIS — D25.1 FIBROIDS, INTRAMURAL: ICD-10-CM

## 2022-10-18 DIAGNOSIS — N92.1 MENORRHAGIA WITH IRREGULAR CYCLE: Primary | ICD-10-CM

## 2022-10-18 RX ORDER — ZOLPIDEM TARTRATE 5 MG/1
TABLET ORAL
Qty: 30 TABLET | Refills: 5 | Status: SHIPPED | OUTPATIENT
Start: 2022-10-18

## 2022-10-18 NOTE — TELEPHONE ENCOUNTER
Dru Balderas and I discussed endometrial biopsy benign result. She asks for definitive surgical intervention due to abnormal bleeding as well as family history. We'll schedule. The procedure with it's indications, risks and complications was discussed. These include but are not limited to: bleeding, infection, injury and VTE. Any of these could require further medical and / or surgical therapy. We discussed prophylactic measures as well. Questions answered and consent obtained.

## 2022-10-18 NOTE — TELEPHONE ENCOUNTER
OB GYN SURGICAL SCHEDULING    Assessment: Menorrhagia with irregular cycle, Intramural fibroid. Family history of uterine cancer. Pre-Operative Procedure:  Robotic TLH-BS    Side: bilateral    In / on:     Date:  As soon as available for OR and patient.      Admission:  Day Surgery - will discharge same day    Anesthesia: General    Additional Orders:  Routine Orders including type / screen    Comments / Orders to Nurse: Alison Johnson to assist

## 2022-10-19 NOTE — TELEPHONE ENCOUNTER
To ANSELMO please advise if OK to scheduled surgery on Mon,11/21 at 1:30pm    Per hospitals works remote, would like to know recovery time. [facility] has Company part on [address] would like to know if she would be able attend.

## 2022-10-20 ENCOUNTER — TELEPHONE (OUTPATIENT)
Dept: INTERNAL MEDICINE CLINIC | Facility: CLINIC | Age: 42
End: 2022-10-20

## 2022-10-20 DIAGNOSIS — E83.19 HIGH BONE MARROW IRON STORES: Primary | ICD-10-CM

## 2022-10-20 NOTE — TELEPHONE ENCOUNTER
Is there some other day that week of 11-29 she would like to consider?  I cannot be available that Friday in early December- No cases please

## 2022-10-20 NOTE — TELEPHONE ENCOUNTER
Pt hoping to speak to Dr Jovon Slater  Would like to discuss results via telephone call  mychart messaging was not working well for patient  Please call patient to discuss/advise  Tasked to nursing

## 2022-10-21 NOTE — TELEPHONE ENCOUNTER
Spoke to pt aware surgery is scheduled on Monday,11/21/2022 at 1:30pm    Medhat Encarnacion, he is available to assist    Per [employer] No PA Needed      Major case and antimicrobial wash instructions sent via [employer]    Delayed staff message to MD to please place pre-op orders    Entered in book and calender

## 2022-10-22 LAB
% SATURATION: 26 % (CALC) (ref 16–45)
FERRITIN: 89 NG/ML (ref 16–232)
IRON BINDING CAPACITY: 326 MCG/DL (CALC) (ref 250–450)
IRON, TOTAL: 86 MCG/DL (ref 40–190)

## 2022-11-09 ENCOUNTER — TELEPHONE (OUTPATIENT)
Dept: OBGYN CLINIC | Facility: CLINIC | Age: 42
End: 2022-11-09

## 2022-11-09 NOTE — TELEPHONE ENCOUNTER
Pt having surgery on 11/21. Pt asking if she can be a passenger for the following week or are there restrictions.     Pls advise

## 2022-11-09 NOTE — TELEPHONE ENCOUNTER
Spoke to pt. Advised restrictions will be provided once she gets discharged from hospital. Aware possibility of surgery being canceled due to ANSELMO having an emergency. Advised will call with an update on the Wednesday before surgery.

## 2022-11-18 ENCOUNTER — LAB ENCOUNTER (OUTPATIENT)
Dept: LAB | Facility: HOSPITAL | Age: 42
End: 2022-11-18
Attending: OBSTETRICS & GYNECOLOGY
Payer: COMMERCIAL

## 2022-11-18 DIAGNOSIS — Z01.818 PREOP TESTING: ICD-10-CM

## 2022-11-18 LAB
ANTIBODY SCREEN: NEGATIVE
RH BLOOD TYPE: POSITIVE
RH BLOOD TYPE: POSITIVE

## 2022-11-18 PROCEDURE — 86850 RBC ANTIBODY SCREEN: CPT

## 2022-11-18 PROCEDURE — 86900 BLOOD TYPING SEROLOGIC ABO: CPT

## 2022-11-18 PROCEDURE — 36415 COLL VENOUS BLD VENIPUNCTURE: CPT

## 2022-11-18 PROCEDURE — 86901 BLOOD TYPING SEROLOGIC RH(D): CPT

## 2022-11-19 LAB — SARS-COV-2 RNA RESP QL NAA+PROBE: NOT DETECTED

## 2022-11-21 ENCOUNTER — HOSPITAL ENCOUNTER (OUTPATIENT)
Facility: HOSPITAL | Age: 42
Setting detail: HOSPITAL OUTPATIENT SURGERY
Discharge: HOME | End: 2022-11-21
Attending: OBSTETRICS & GYNECOLOGY | Admitting: OBSTETRICS & GYNECOLOGY
Payer: COMMERCIAL

## 2022-11-21 ENCOUNTER — ANESTHESIA (OUTPATIENT)
Dept: SURGERY | Facility: HOSPITAL | Age: 42
End: 2022-11-21
Payer: COMMERCIAL

## 2022-11-21 ENCOUNTER — ANESTHESIA EVENT (OUTPATIENT)
Dept: SURGERY | Facility: HOSPITAL | Age: 42
End: 2022-11-21
Payer: COMMERCIAL

## 2022-11-21 VITALS
SYSTOLIC BLOOD PRESSURE: 125 MMHG | RESPIRATION RATE: 19 BRPM | BODY MASS INDEX: 25.76 KG/M2 | TEMPERATURE: 98 F | DIASTOLIC BLOOD PRESSURE: 88 MMHG | WEIGHT: 140 LBS | HEART RATE: 87 BPM | HEIGHT: 62 IN | OXYGEN SATURATION: 94 %

## 2022-11-21 DIAGNOSIS — D25.1 FIBROIDS, INTRAMURAL: ICD-10-CM

## 2022-11-21 DIAGNOSIS — Z01.818 PREOP TESTING: Primary | ICD-10-CM

## 2022-11-21 DIAGNOSIS — Z80.9 FAMILY HISTORY OF CANCER: ICD-10-CM

## 2022-11-21 DIAGNOSIS — N92.1 MENORRHAGIA WITH IRREGULAR CYCLE: ICD-10-CM

## 2022-11-21 LAB — B-HCG UR QL: NEGATIVE

## 2022-11-21 PROCEDURE — 88307 TISSUE EXAM BY PATHOLOGIST: CPT | Performed by: OBSTETRICS & GYNECOLOGY

## 2022-11-21 PROCEDURE — 8E0W4CZ ROBOTIC ASSISTED PROCEDURE OF TRUNK REGION, PERCUTANEOUS ENDOSCOPIC APPROACH: ICD-10-PCS | Performed by: OBSTETRICS & GYNECOLOGY

## 2022-11-21 PROCEDURE — 0UT94ZZ RESECTION OF UTERUS, PERCUTANEOUS ENDOSCOPIC APPROACH: ICD-10-PCS | Performed by: OBSTETRICS & GYNECOLOGY

## 2022-11-21 PROCEDURE — 0UT74ZZ RESECTION OF BILATERAL FALLOPIAN TUBES, PERCUTANEOUS ENDOSCOPIC APPROACH: ICD-10-PCS | Performed by: OBSTETRICS & GYNECOLOGY

## 2022-11-21 PROCEDURE — 81025 URINE PREGNANCY TEST: CPT

## 2022-11-21 PROCEDURE — 88309 TISSUE EXAM BY PATHOLOGIST: CPT | Performed by: OBSTETRICS & GYNECOLOGY

## 2022-11-21 RX ORDER — NEOSTIGMINE METHYLSULFATE 1 MG/ML
INJECTION, SOLUTION INTRAVENOUS AS NEEDED
Status: DISCONTINUED | OUTPATIENT
Start: 2022-11-21 | End: 2022-11-21 | Stop reason: SURG

## 2022-11-21 RX ORDER — MIDAZOLAM HYDROCHLORIDE 1 MG/ML
INJECTION INTRAMUSCULAR; INTRAVENOUS AS NEEDED
Status: DISCONTINUED | OUTPATIENT
Start: 2022-11-21 | End: 2022-11-21 | Stop reason: SURG

## 2022-11-21 RX ORDER — MORPHINE SULFATE 4 MG/ML
4 INJECTION, SOLUTION INTRAMUSCULAR; INTRAVENOUS EVERY 10 MIN PRN
Status: DISCONTINUED | OUTPATIENT
Start: 2022-11-21 | End: 2022-11-21

## 2022-11-21 RX ORDER — GLYCOPYRROLATE 0.2 MG/ML
INJECTION, SOLUTION INTRAMUSCULAR; INTRAVENOUS AS NEEDED
Status: DISCONTINUED | OUTPATIENT
Start: 2022-11-21 | End: 2022-11-21 | Stop reason: SURG

## 2022-11-21 RX ORDER — IBUPROFEN 600 MG/1
600 TABLET ORAL EVERY 6 HOURS PRN
Qty: 30 TABLET | Refills: 0 | Status: SHIPPED | OUTPATIENT
Start: 2022-11-21

## 2022-11-21 RX ORDER — HYDROMORPHONE HYDROCHLORIDE 1 MG/ML
0.2 INJECTION, SOLUTION INTRAMUSCULAR; INTRAVENOUS; SUBCUTANEOUS EVERY 5 MIN PRN
Status: DISCONTINUED | OUTPATIENT
Start: 2022-11-21 | End: 2022-11-21

## 2022-11-21 RX ORDER — HYDROCODONE BITARTRATE AND ACETAMINOPHEN 5; 325 MG/1; MG/1
2 TABLET ORAL ONCE AS NEEDED
Status: COMPLETED | OUTPATIENT
Start: 2022-11-21 | End: 2022-11-21

## 2022-11-21 RX ORDER — GABAPENTIN 300 MG/1
300 CAPSULE ORAL 3 TIMES DAILY PRN
Qty: 15 CAPSULE | Refills: 0 | Status: SHIPPED | OUTPATIENT
Start: 2022-11-21

## 2022-11-21 RX ORDER — LIDOCAINE HYDROCHLORIDE 10 MG/ML
INJECTION, SOLUTION EPIDURAL; INFILTRATION; INTRACAUDAL; PERINEURAL AS NEEDED
Status: DISCONTINUED | OUTPATIENT
Start: 2022-11-21 | End: 2022-11-21 | Stop reason: SURG

## 2022-11-21 RX ORDER — KETOROLAC TROMETHAMINE 30 MG/ML
INJECTION, SOLUTION INTRAMUSCULAR; INTRAVENOUS AS NEEDED
Status: DISCONTINUED | OUTPATIENT
Start: 2022-11-21 | End: 2022-11-21 | Stop reason: SURG

## 2022-11-21 RX ORDER — SODIUM CHLORIDE 9 MG/ML
INJECTION, SOLUTION INTRAVENOUS CONTINUOUS PRN
Status: DISCONTINUED | OUTPATIENT
Start: 2022-11-21 | End: 2022-11-21 | Stop reason: SURG

## 2022-11-21 RX ORDER — PROCHLORPERAZINE EDISYLATE 5 MG/ML
5 INJECTION INTRAMUSCULAR; INTRAVENOUS EVERY 8 HOURS PRN
Status: DISCONTINUED | OUTPATIENT
Start: 2022-11-21 | End: 2022-11-21

## 2022-11-21 RX ORDER — BUPIVACAINE HYDROCHLORIDE AND EPINEPHRINE 2.5; 5 MG/ML; UG/ML
INJECTION, SOLUTION INFILTRATION; PERINEURAL AS NEEDED
Status: DISCONTINUED | OUTPATIENT
Start: 2022-11-21 | End: 2022-11-21 | Stop reason: HOSPADM

## 2022-11-21 RX ORDER — ACETAMINOPHEN 500 MG
1000 TABLET ORAL ONCE AS NEEDED
Status: COMPLETED | OUTPATIENT
Start: 2022-11-21 | End: 2022-11-21

## 2022-11-21 RX ORDER — ONDANSETRON 2 MG/ML
INJECTION INTRAMUSCULAR; INTRAVENOUS AS NEEDED
Status: DISCONTINUED | OUTPATIENT
Start: 2022-11-21 | End: 2022-11-21 | Stop reason: SURG

## 2022-11-21 RX ORDER — HYDROCODONE BITARTRATE AND ACETAMINOPHEN 5; 325 MG/1; MG/1
1 TABLET ORAL ONCE AS NEEDED
Status: COMPLETED | OUTPATIENT
Start: 2022-11-21 | End: 2022-11-21

## 2022-11-21 RX ORDER — HYDROMORPHONE HYDROCHLORIDE 1 MG/ML
0.6 INJECTION, SOLUTION INTRAMUSCULAR; INTRAVENOUS; SUBCUTANEOUS EVERY 5 MIN PRN
Status: DISCONTINUED | OUTPATIENT
Start: 2022-11-21 | End: 2022-11-21

## 2022-11-21 RX ORDER — SODIUM CHLORIDE, SODIUM LACTATE, POTASSIUM CHLORIDE, CALCIUM CHLORIDE 600; 310; 30; 20 MG/100ML; MG/100ML; MG/100ML; MG/100ML
INJECTION, SOLUTION INTRAVENOUS CONTINUOUS
Status: DISCONTINUED | OUTPATIENT
Start: 2022-11-21 | End: 2022-11-21

## 2022-11-21 RX ORDER — MORPHINE SULFATE 4 MG/ML
2 INJECTION, SOLUTION INTRAMUSCULAR; INTRAVENOUS EVERY 10 MIN PRN
Status: DISCONTINUED | OUTPATIENT
Start: 2022-11-21 | End: 2022-11-21

## 2022-11-21 RX ORDER — EPHEDRINE SULFATE 50 MG/ML
INJECTION, SOLUTION INTRAVENOUS AS NEEDED
Status: DISCONTINUED | OUTPATIENT
Start: 2022-11-21 | End: 2022-11-21 | Stop reason: SURG

## 2022-11-21 RX ORDER — DEXAMETHASONE SODIUM PHOSPHATE 4 MG/ML
VIAL (ML) INJECTION AS NEEDED
Status: DISCONTINUED | OUTPATIENT
Start: 2022-11-21 | End: 2022-11-21 | Stop reason: SURG

## 2022-11-21 RX ORDER — NALOXONE HYDROCHLORIDE 0.4 MG/ML
80 INJECTION, SOLUTION INTRAMUSCULAR; INTRAVENOUS; SUBCUTANEOUS AS NEEDED
Status: DISCONTINUED | OUTPATIENT
Start: 2022-11-21 | End: 2022-11-21

## 2022-11-21 RX ORDER — HYDROMORPHONE HYDROCHLORIDE 1 MG/ML
0.4 INJECTION, SOLUTION INTRAMUSCULAR; INTRAVENOUS; SUBCUTANEOUS EVERY 5 MIN PRN
Status: DISCONTINUED | OUTPATIENT
Start: 2022-11-21 | End: 2022-11-21

## 2022-11-21 RX ORDER — SCOLOPAMINE TRANSDERMAL SYSTEM 1 MG/1
1 PATCH, EXTENDED RELEASE TRANSDERMAL
Status: DISCONTINUED | OUTPATIENT
Start: 2022-11-21 | End: 2022-11-21 | Stop reason: HOSPADM

## 2022-11-21 RX ORDER — ONDANSETRON 2 MG/ML
4 INJECTION INTRAMUSCULAR; INTRAVENOUS EVERY 6 HOURS PRN
Status: DISCONTINUED | OUTPATIENT
Start: 2022-11-21 | End: 2022-11-21

## 2022-11-21 RX ORDER — ROCURONIUM BROMIDE 10 MG/ML
INJECTION, SOLUTION INTRAVENOUS AS NEEDED
Status: DISCONTINUED | OUTPATIENT
Start: 2022-11-21 | End: 2022-11-21 | Stop reason: SURG

## 2022-11-21 RX ORDER — HYDROCODONE BITARTRATE AND ACETAMINOPHEN 5; 325 MG/1; MG/1
1 TABLET ORAL EVERY 6 HOURS PRN
Qty: 15 TABLET | Refills: 0 | Status: SHIPPED | OUTPATIENT
Start: 2022-11-21

## 2022-11-21 RX ORDER — MORPHINE SULFATE 10 MG/ML
6 INJECTION, SOLUTION INTRAMUSCULAR; INTRAVENOUS EVERY 10 MIN PRN
Status: DISCONTINUED | OUTPATIENT
Start: 2022-11-21 | End: 2022-11-21

## 2022-11-21 RX ORDER — CEFAZOLIN SODIUM/WATER 2 G/20 ML
2 SYRINGE (ML) INTRAVENOUS ONCE
Status: COMPLETED | OUTPATIENT
Start: 2022-11-21 | End: 2022-11-21

## 2022-11-21 RX ORDER — HEPARIN SODIUM 5000 [USP'U]/ML
5000 INJECTION, SOLUTION INTRAVENOUS; SUBCUTANEOUS ONCE
Status: COMPLETED | OUTPATIENT
Start: 2022-11-21 | End: 2022-11-21

## 2022-11-21 RX ORDER — ACETAMINOPHEN 500 MG
1000 TABLET ORAL ONCE
Status: COMPLETED | OUTPATIENT
Start: 2022-11-21 | End: 2022-11-21

## 2022-11-21 RX ADMIN — EPHEDRINE SULFATE 5 MG: 50 INJECTION, SOLUTION INTRAVENOUS at 13:50:00

## 2022-11-21 RX ADMIN — ROCURONIUM BROMIDE 40 MG: 10 INJECTION, SOLUTION INTRAVENOUS at 13:21:00

## 2022-11-21 RX ADMIN — SODIUM CHLORIDE, SODIUM LACTATE, POTASSIUM CHLORIDE, CALCIUM CHLORIDE: 600; 310; 30; 20 INJECTION, SOLUTION INTRAVENOUS at 13:14:00

## 2022-11-21 RX ADMIN — ROCURONIUM BROMIDE 10 MG: 10 INJECTION, SOLUTION INTRAVENOUS at 13:45:00

## 2022-11-21 RX ADMIN — NEOSTIGMINE METHYLSULFATE 3 MG: 1 INJECTION, SOLUTION INTRAVENOUS at 14:26:00

## 2022-11-21 RX ADMIN — LIDOCAINE HYDROCHLORIDE 50 MG: 10 INJECTION, SOLUTION EPIDURAL; INFILTRATION; INTRACAUDAL; PERINEURAL at 13:20:00

## 2022-11-21 RX ADMIN — SODIUM CHLORIDE: 9 INJECTION, SOLUTION INTRAVENOUS at 13:25:00

## 2022-11-21 RX ADMIN — ONDANSETRON 4 MG: 2 INJECTION INTRAMUSCULAR; INTRAVENOUS at 14:21:00

## 2022-11-21 RX ADMIN — GLYCOPYRROLATE 0.6 MG: 0.2 INJECTION, SOLUTION INTRAMUSCULAR; INTRAVENOUS at 14:26:00

## 2022-11-21 RX ADMIN — CEFAZOLIN SODIUM/WATER 2 G: 2 G/20 ML SYRINGE (ML) INTRAVENOUS at 13:34:00

## 2022-11-21 RX ADMIN — DEXAMETHASONE SODIUM PHOSPHATE 8 MG: 4 MG/ML VIAL (ML) INJECTION at 13:30:00

## 2022-11-21 RX ADMIN — MIDAZOLAM HYDROCHLORIDE 2 MG: 1 INJECTION INTRAMUSCULAR; INTRAVENOUS at 13:16:00

## 2022-11-21 RX ADMIN — KETOROLAC TROMETHAMINE 30 MG: 30 INJECTION, SOLUTION INTRAMUSCULAR; INTRAVENOUS at 14:23:00

## 2022-11-21 NOTE — BRIEF OP NOTE
Pre-Operative Diagnosis: Menorrhagia with irregular cycle [N92.1]  Fibroids, intramural [D25.1]  Family history of cancer [Z80.9]     Post-Operative Diagnosis: Menorrhagia with irregular cycle [F64. 1]Fibroids, intramural [D25. 1]Family history of cancer [Z80.9]      Procedure Performed:   Xi robot-assisted total laparoscopic hysterectomy, bilateral salpingectomy    Surgeon(s) and Role:     * Zak Lowery DO - Primary    Assistant(s):  Surgical Assistant.: Quinton Marrero     Surgical Findings: Uterus 8 weeks size with 2 intramural fibroids. Normal bilateral tubes, ovaries and upper abdomen. Specimen: Uterus, cervix and bilateral tubes     Estimated Blood Loss: 30 ml    Dictation Number:  [id number]    Zak GOLDEN  [id number] [facility]  11/21/2022  2:20 PM

## 2022-11-21 NOTE — DISCHARGE INSTRUCTIONS
Discharge instructions    Nothing in vagina- no tampons or sexual activity. May shower in 24 hours but no bath. May drive in 1 week. Call if increased pain, bleeding or oral temperature > 100.3. [facility] HOME CARE INSTRUCTIONS: POST-OP ANESTHESIA  The medication that you received for sedation or general anesthesia can last up to 24 hours. Your judgment and reflexes may be altered, even if you feel like your normal self. We Recommend:   Do not drive any motor vehicle or bicycle   Avoid mowing the lawn, playing sports, or working with power tools/applicances (power saws, electric knives or mixers)   That you have someone stay with you on your first night home   Do not drink alcohol or take sleeping pills or tranquilizers   Do not sign legal documents within 24 hours of your procedure   If you had a nerve block for your surgery, take extra care not to put any pressure on your arm or hand for 24 hours    It is normal:  For you to have a sore throat if you had a breathing tube during surgery (while you were asleep!). The sore throat should get better within 48 hours. You can gargle with warm salt water (1/2 tsp in 4 oz warm water) or use a throat lozenge for comfort  To feel muscle aches or soreness especially in the abdomen, chest or neck. The achy feeling should go away in the next 24 hours  To feel weak, sleepy or \"wiped out\". Your should start feeling better in the next 24 hours. To experience mild discomforts such as sore lip or tongue, headache, cramps, gas pains or a bloated feeling in your abdomen. To experience mild back pain or soreness for a day or two if you had spinal or epidural anesthesia. If you had laparoscopic surgery, to feel shoulder pain or discomfort on the day of surgery. For some patients to have nausea after surgery/anesthesia    If you feel nausea or experience vomiting:   Try to move around less.    Eat less than usual or drink only liquids until the next morning Nausea should resolve in about 24 hours    If you have a problem when you are at home:    Call your surgeons office

## 2022-11-21 NOTE — INTERVAL H&P NOTE
Pre-op Diagnosis: Menorrhagia with irregular cycle [N92.1]  Fibroids, intramural [D25.1]  Family history of cancer [Z80.9]    The above referenced H&P was reviewed by Tony Helms. DO Beverley on 11/21/2022, the patient was examined and no significant changes have occurred in the patient's condition since the H&P was performed. I discussed with the patient and/or legal representative the potential benefits, risks and side effects of this procedure; the likelihood of the patient achieving goals; and potential problems that might occur during recuperation. I discussed reasonable alternatives to the procedure, including risks, benefits and side effects related to the alternatives and risks related to not receiving this procedure. We will proceed with procedure as planned. Spouse present for discussion.

## 2022-11-21 NOTE — ANESTHESIA PROCEDURE NOTES
Airway  Date/Time: 11/21/2022 1:24 PM  Urgency: elective    Airway not difficult    General Information and Staff    Patient location during procedure: OR  Anesthesiologist: Richie Adame MD  Resident/CRNA: Keke Garcia CRNA  Performed: CRNA     Indications and Patient Condition  Indications for airway management: anesthesia  Spontaneous Ventilation: absent  Sedation level: deep  Preoxygenated: yes  Patient position: sniffing  MILS maintained throughout  Mask difficulty assessment: 2 - vent by mask + OA or adjuvant +/- NMBA  No planned trial extubation    Final Airway Details  Final airway type: endotracheal airway      Successful airway: ETT  Cuffed: yes   Successful intubation technique: direct laryngoscopy  Facilitating devices/methods: intubating stylet  Endotracheal tube insertion site: oral  Blade: Jayden  Blade size: #3  ETT size (mm): 7.0    Cormack-Lehane Classification: grade I - full view of glottis  Placement verified by: chest auscultation and capnometry   Cuff volume (mL): 5  Measured from: teeth  ETT to teeth (cm): 20  Number of attempts at approach: 1  Number of other approaches attempted: 0

## 2022-11-22 NOTE — OPERATIVE REPORT
[facility]    PATIENT'S NAME: Freida Roa   ATTENDING PHYSICIAN: Zak Medrano DO   OPERATING PHYSICIAN: Tacos Medrano DO   PATIENT ACCOUNT#:   [de-identified]    LOCATION:  [facility]  MEDICAL RECORD #:   [id number]       YOB: 1980  ADMISSION DATE:       11/21/2022      OPERATION DATE:  11/21/2022    OPERATIVE REPORT      PREOPERATIVE DIAGNOSIS:  Menorrhagia with irregular cycle intramural fibroids and family history of endometrial cancer. POSTOPERATIVE DIAGNOSIS:  Menorrhagia with irregular cycle intramural fibroids and family history of endometrial cancer. PROCEDURE:  Robotic-assisted laparoscopic hysterectomy with bilateral salpingectomy. ASSISTANT:  Liv Villasenor. KAREL Clifford    ANESTHESIA:  General/endotracheal.    ESTIMATED BLOOD LOSS:  30 mL. COMPLICATIONS:  None apparent. PATHOLOGY SPECIMEN:  Uterus, cervix, and bilateral tubes. FINDINGS:  External genitalia, vaginal mucosa, and cervix were without lesions. Laparoscopic findings show a uterus with at least 2 identified intramural fibroids. Normal bilateral tubes, ovaries, and upper abdomen. OPERATIVE TECHNIQUE:  After consent was obtained, the patient was taken to the operating suite where general anesthetic was administered, and she was prepped and draped in the usual fashion. Next, bimanual exam was performed, and a Brigade medium cup manipulator was placed without difficulty. We then switched attention to the laparoscopic portion of the case. The supraumbilical area was infiltrated with Marcaine 0.25%, and an 8 mm vertical incision was placed. Through this, the Veress needle was inserted, placement tested, and insufflation was begun. After adequate pneumoperitoneum was observed, the Veress needle was removed, and an 8 mm robotic scope trocar was placed. We then placed robotic ports for arms 2 and 4 in the left and right mid quadrants, respectively.   We used Marcaine at these sites as well.  Next, we placed an 8 mm AirSeal assistant port in the right lower quadrant, also under direct visualization and using Marcaine. Once this was accomplished, we brought the robot over and docked only the camera port arm 3 and performed targeting procedure. Once this was accomplished, we had already been in full Trendelenburg, and we docked arms 2 and 4. I then moved to the robotic console. The anatomy had been previously examined. I then used the vessel sealer to dissect along the mesosalpinx  the tube from the ovary and parametrium. It was still attached medially at the uterus. We then fulgurated and cut the round ligament on the left side. I then used monopolar scissors to incise the vesicouterine peritoneal reflection across the midline to the right side. The bladder flap was developed with sharp and blunt dissection. I then used the vessel sealer to fulgurate the suspensory ligament of the ovary, the parametrial tissue, all the way down in sequential bites to the left uterine vasculature. Next, we moved posteriorly to the left uterosacral ligament, and this was fulgurated and cut as well. Once this was accomplished, we moved to the right side. I fulgurated and cut the right round ligament and completed the incision of the vesicouterine peritoneal reflection. I then finished dissecting the bladder flap with sharp and blunt dissection. I then used a vessel sealer on the right side going through the proximal tube, right utero-ovarian ligament, all the way down through the parametrial tissue in sequential bites to the right uterine vasculature. All was fulgurated and cut without difficulty, and minimal bleeding was noted during the procedure. I then fulgurated and cut the right uterosacral ligament. Then using monopolar cutting, we performed colpotomy. The uterus was released and passed vaginally with the attached left tube.   I then performed salpingectomy on the right side, and this was passed through the assist port. I then asked for a 6-inch 0 Vicryl suture, and a single interrupted suture was placed on the right cuff corner incorporating the supporting ligament on that right side. This needle was passed out, and then using the 9-inch absorbable V-Loc suture, I closed the cuff from left to right incorporating the supporting ligament on the left side as well. Good hemostasis was observed. We slowly evacuated the pneumoperitoneum with no bleeding noted at the operative sites. We asked for 5 manual deep breaths to further expel the insufflation gas. Once this was accomplished, all instruments had been previously removed. Trocars were then removed, and we closed all incisions at the subcuticular level with sutures of 4-0 Monocryl. All incisions were covered with Exofin. The vaginal pack was removed from the vagina. All final counts were noted to be correct. The patient was then placed back in the supine position, awakened from anesthesia, and transferred to postanesthesia care unit in stable condition. Dictated By Lianne Medrano DO  d: 11/21/2022 14:28:13  t: 11/21/2022 18:30:54  Job [id number]  APY/    cc: Madlyn Jeans.  DO Annalee Medrano DO

## 2022-11-25 ENCOUNTER — TELEPHONE (OUTPATIENT)
Dept: OBGYN CLINIC | Facility: CLINIC | Age: 42
End: 2022-11-25

## 2022-11-25 NOTE — TELEPHONE ENCOUNTER
I reviewed images and appears as hematoma and not infection.  OK for rest, warm pad 15 minutes at a time QID and I'd like to add her Monday AM at [facility] or Tuesday at [facility] at beginning- 1250 or end- 4 PM.

## 2022-11-25 NOTE — TELEPHONE ENCOUNTER
Pt called and informed of [facility] recs. Pt states understanding and accepts appt on Monday at 920 am. Pain precautions given.

## 2022-11-25 NOTE — TELEPHONE ENCOUNTER
Pt called and informed that ANSELMO still needs to review information. Pt states understanding.    -Heparin x1 dose given pre-op, where shot was given and IV are bruised. - right sidePainful-3-4/10 and warm to touch. Pt states considerable more sore compared to the other side. Radiates to the flank.     -Increase fatigue     -States abdomen is swollen, no BM in 5 days, is on stool softeners. .    To ANSELMO to please review and advise. Please look at pictures attached to mychart from today.

## 2022-11-25 NOTE — TELEPHONE ENCOUNTER
Pt had a TLH-BS on 11/21/2022. Pt states the incision on her right side has a ton of bruising. State there are 2 incisions on the right and the lower one had a huge hematoma. Pt states it is about 2-3 inches wide, then slants down and continues into the pubic area. Pt states she is sore on the right side, but it does get better every day. Pt wondering if this is ok or if she should be evaluated. Pt sent pics through [employer].   Message to ANSELMO.

## 2022-11-28 ENCOUNTER — OFFICE VISIT (OUTPATIENT)
Dept: OBGYN CLINIC | Facility: CLINIC | Age: 42
End: 2022-11-28
Payer: COMMERCIAL

## 2022-11-28 VITALS
BODY MASS INDEX: 27 KG/M2 | SYSTOLIC BLOOD PRESSURE: 115 MMHG | DIASTOLIC BLOOD PRESSURE: 76 MMHG | HEART RATE: 87 BPM | WEIGHT: 145.81 LBS

## 2022-11-28 DIAGNOSIS — R58 POSTOPERATIVE ECCHYMOSIS: Primary | ICD-10-CM

## 2022-11-28 PROBLEM — N92.1 MENORRHAGIA WITH IRREGULAR CYCLE: Status: RESOLVED | Noted: 2022-07-20 | Resolved: 2022-11-28

## 2022-11-28 PROBLEM — D25.1 INTRAMURAL UTERINE FIBROID: Status: RESOLVED | Noted: 2022-07-20 | Resolved: 2022-11-28

## 2022-11-28 PROCEDURE — 3078F DIAST BP <80 MM HG: CPT | Performed by: OBSTETRICS & GYNECOLOGY

## 2022-11-28 PROCEDURE — 3074F SYST BP LT 130 MM HG: CPT | Performed by: OBSTETRICS & GYNECOLOGY

## 2022-11-28 PROCEDURE — 99024 POSTOP FOLLOW-UP VISIT: CPT | Performed by: OBSTETRICS & GYNECOLOGY

## 2022-11-30 ENCOUNTER — TELEPHONE (OUTPATIENT)
Dept: OBGYN CLINIC | Facility: CLINIC | Age: 42
End: 2022-11-30

## 2022-11-30 NOTE — TELEPHONE ENCOUNTER
Pt is 9 days s/p surgery, reports having \"GI pain\". Reports pain to rectal area right before having a BM. Was having constipation last week. States stool is soft. States she is taking Colace BID and was taking Miralax but stopped the miralax due to having 5 stools a day. Reports hx of hemorrhoids but none noted recently. Denies bleeding with BM. Having daily soft BMs. Pt is not taking Norco and gabapentin and only taking ibuprofen for one week. States she is feeling \"crummy\". Had chills yesterday. Denies fever, and signes of infection to incisions. States she worked from home yesterday and was sitting for 8 hours. Advised pt her body is still healing and increased activity can affect her. Advised pt to rest and increase hydration. Advised sitting up takes abdominal muscle activation. Advised to eat soft foods. Advised the increased stress after surgery and constipation can cause hemorrhoid flare up. Advised can use otc hemorrhoid supp. Pt to call back if develops symptoms discussed or not feeling better by Monday.

## 2022-12-02 ENCOUNTER — HOSPITAL ENCOUNTER (INPATIENT)
Facility: HOSPITAL | Age: 42
LOS: 4 days | Discharge: HOME | End: 2022-12-06
Attending: EMERGENCY MEDICINE | Admitting: INTERNAL MEDICINE
Payer: COMMERCIAL

## 2022-12-02 ENCOUNTER — LAB ENCOUNTER (OUTPATIENT)
Dept: LAB | Facility: HOSPITAL | Age: 42
End: 2022-12-02
Attending: OBSTETRICS & GYNECOLOGY
Payer: COMMERCIAL

## 2022-12-02 ENCOUNTER — TELEPHONE (OUTPATIENT)
Dept: PEDIATRICS CLINIC | Facility: CLINIC | Age: 42
End: 2022-12-02

## 2022-12-02 ENCOUNTER — OFFICE VISIT (OUTPATIENT)
Dept: OBGYN CLINIC | Facility: CLINIC | Age: 42
End: 2022-12-02
Payer: COMMERCIAL

## 2022-12-02 ENCOUNTER — HOSPITAL ENCOUNTER (OUTPATIENT)
Dept: CT IMAGING | Facility: HOSPITAL | Age: 42
Discharge: HOME | End: 2022-12-02
Attending: OBSTETRICS & GYNECOLOGY
Payer: COMMERCIAL

## 2022-12-02 VITALS
SYSTOLIC BLOOD PRESSURE: 102 MMHG | HEART RATE: 106 BPM | TEMPERATURE: 98 F | DIASTOLIC BLOOD PRESSURE: 63 MMHG | WEIGHT: 145 LBS | BODY MASS INDEX: 27 KG/M2

## 2022-12-02 DIAGNOSIS — R58 POSTOPERATIVE ECCHYMOSIS: ICD-10-CM

## 2022-12-02 DIAGNOSIS — E78.5 DYSLIPIDEMIA: ICD-10-CM

## 2022-12-02 DIAGNOSIS — G89.18 POST-OPERATIVE PAIN: ICD-10-CM

## 2022-12-02 DIAGNOSIS — R73.01 ABNORMAL FASTING GLUCOSE: ICD-10-CM

## 2022-12-02 DIAGNOSIS — G89.18 POST-OPERATIVE PAIN: Primary | ICD-10-CM

## 2022-12-02 DIAGNOSIS — D72.829 LEUKOCYTOSIS, UNSPECIFIED TYPE: ICD-10-CM

## 2022-12-02 DIAGNOSIS — T81.43XA POSTOPERATIVE INTRA-ABDOMINAL ABSCESS: Primary | ICD-10-CM

## 2022-12-02 PROBLEM — K65.1 POSTOPERATIVE INTRA-ABDOMINAL ABSCESS (HCC): Status: ACTIVE | Noted: 2022-12-02

## 2022-12-02 LAB
ALBUMIN SERPL-MCNC: 3.4 G/DL (ref 3.4–5)
ALBUMIN/GLOB SERPL: 0.9 {RATIO} (ref 1–2)
ALP LIVER SERPL-CCNC: 159 U/L
ALT SERPL-CCNC: 301 U/L
ANION GAP SERPL CALC-SCNC: 9 MMOL/L (ref 0–18)
AST SERPL-CCNC: 198 U/L (ref 15–37)
BASOPHILS # BLD AUTO: 0.05 X10(3) UL (ref 0–0.2)
BASOPHILS NFR BLD AUTO: 0.2 %
BILIRUB SERPL-MCNC: 2.1 MG/DL (ref 0.1–2)
BUN BLD-MCNC: 9 MG/DL (ref 7–18)
BUN/CREAT SERPL: 13.6 (ref 10–20)
CALCIUM BLD-MCNC: 8.9 MG/DL (ref 8.5–10.1)
CHLORIDE SERPL-SCNC: 103 MMOL/L (ref 98–112)
CHOLEST SERPL-MCNC: 217 MG/DL (ref ?–200)
CO2 SERPL-SCNC: 26 MMOL/L (ref 21–32)
CREAT BLD-MCNC: 0.5 MG/DL
CREAT BLD-MCNC: 0.66 MG/DL
DEPRECATED RDW RBC AUTO: 45.4 FL (ref 35.1–46.3)
EOSINOPHIL # BLD AUTO: 0.02 X10(3) UL (ref 0–0.7)
EOSINOPHIL NFR BLD AUTO: 0.1 %
ERYTHROCYTE [DISTWIDTH] IN BLOOD BY AUTOMATED COUNT: 12.8 % (ref 11–15)
EST. AVERAGE GLUCOSE BLD GHB EST-MCNC: 94 MG/DL (ref 68–126)
FASTING PATIENT LIPID ANSWER: YES
FASTING STATUS PATIENT QL REPORTED: YES
GFR SERPLBLD BASED ON 1.73 SQ M-ARVRAT: 111 ML/MIN/1.73M2 (ref 60–?)
GFR SERPLBLD BASED ON 1.73 SQ M-ARVRAT: 119 ML/MIN/1.73M2 (ref 60–?)
GLOBULIN PLAS-MCNC: 3.8 G/DL (ref 2.8–4.4)
GLUCOSE BLD-MCNC: 102 MG/DL (ref 70–99)
HBA1C MFR BLD: 4.9 % (ref ?–5.7)
HCT VFR BLD AUTO: 35.8 %
HDLC SERPL-MCNC: 82 MG/DL (ref 40–59)
HGB BLD-MCNC: 12.1 G/DL
IMM GRANULOCYTES # BLD AUTO: 0.2 X10(3) UL (ref 0–1)
IMM GRANULOCYTES NFR BLD: 0.8 %
LACTATE SERPL-SCNC: 1.2 MMOL/L (ref 0.4–2)
LDLC SERPL CALC-MCNC: 120 MG/DL (ref ?–100)
LYMPHOCYTES # BLD AUTO: 0.79 X10(3) UL (ref 1–4)
LYMPHOCYTES NFR BLD AUTO: 3.1 %
MCH RBC QN AUTO: 32.6 PG (ref 26–34)
MCHC RBC AUTO-ENTMCNC: 33.8 G/DL (ref 31–37)
MCV RBC AUTO: 96.5 FL
MONOCYTES # BLD AUTO: 1.02 X10(3) UL (ref 0.1–1)
MONOCYTES NFR BLD AUTO: 4 %
NEUTROPHILS # BLD AUTO: 23.14 X10 (3) UL (ref 1.5–7.7)
NEUTROPHILS # BLD AUTO: 23.14 X10(3) UL (ref 1.5–7.7)
NEUTROPHILS NFR BLD AUTO: 91.8 %
NONHDLC SERPL-MCNC: 135 MG/DL (ref ?–130)
OSMOLALITY SERPL CALC.SUM OF ELEC: 285 MOSM/KG (ref 275–295)
PLATELET # BLD AUTO: 337 10(3)UL (ref 150–450)
POTASSIUM SERPL-SCNC: 3.8 MMOL/L (ref 3.5–5.1)
PROCALCITONIN SERPL-MCNC: 0.4 NG/ML (ref ?–0.16)
PROT SERPL-MCNC: 7.2 G/DL (ref 6.4–8.2)
RBC # BLD AUTO: 3.71 X10(6)UL
SARS-COV-2 RNA RESP QL NAA+PROBE: NOT DETECTED
SODIUM SERPL-SCNC: 138 MMOL/L (ref 136–145)
TRIGL SERPL-MCNC: 88 MG/DL (ref 30–149)
VLDLC SERPL CALC-MCNC: 15 MG/DL (ref 0–30)
WBC # BLD AUTO: 25.2 X10(3) UL (ref 4–11)

## 2022-12-02 PROCEDURE — 82565 ASSAY OF CREATININE: CPT

## 2022-12-02 PROCEDURE — 36415 COLL VENOUS BLD VENIPUNCTURE: CPT

## 2022-12-02 PROCEDURE — 85025 COMPLETE CBC W/AUTO DIFF WBC: CPT

## 2022-12-02 PROCEDURE — 99213 OFFICE O/P EST LOW 20 MIN: CPT | Performed by: OBSTETRICS & GYNECOLOGY

## 2022-12-02 PROCEDURE — 80053 COMPREHEN METABOLIC PANEL: CPT

## 2022-12-02 PROCEDURE — 74177 CT ABD & PELVIS W/CONTRAST: CPT | Performed by: OBSTETRICS & GYNECOLOGY

## 2022-12-02 PROCEDURE — 83036 HEMOGLOBIN GLYCOSYLATED A1C: CPT

## 2022-12-02 PROCEDURE — 80061 LIPID PANEL: CPT

## 2022-12-02 PROCEDURE — 3074F SYST BP LT 130 MM HG: CPT | Performed by: OBSTETRICS & GYNECOLOGY

## 2022-12-02 PROCEDURE — 3078F DIAST BP <80 MM HG: CPT | Performed by: OBSTETRICS & GYNECOLOGY

## 2022-12-02 RX ORDER — HEPARIN SODIUM 5000 [USP'U]/ML
5000 INJECTION, SOLUTION INTRAVENOUS; SUBCUTANEOUS EVERY 8 HOURS SCHEDULED
Status: DISCONTINUED | OUTPATIENT
Start: 2022-12-03 | End: 2022-12-06

## 2022-12-02 RX ORDER — MORPHINE SULFATE 2 MG/ML
0.5 INJECTION, SOLUTION INTRAMUSCULAR; INTRAVENOUS EVERY 2 HOUR PRN
Status: DISCONTINUED | OUTPATIENT
Start: 2022-12-02 | End: 2022-12-06

## 2022-12-02 RX ORDER — MORPHINE SULFATE 2 MG/ML
1 INJECTION, SOLUTION INTRAMUSCULAR; INTRAVENOUS EVERY 2 HOUR PRN
Status: DISCONTINUED | OUTPATIENT
Start: 2022-12-02 | End: 2022-12-06

## 2022-12-02 RX ORDER — MORPHINE SULFATE 4 MG/ML
4 INJECTION, SOLUTION INTRAMUSCULAR; INTRAVENOUS ONCE
Status: COMPLETED | OUTPATIENT
Start: 2022-12-02 | End: 2022-12-02

## 2022-12-02 RX ORDER — ONDANSETRON 2 MG/ML
4 INJECTION INTRAMUSCULAR; INTRAVENOUS ONCE
Status: COMPLETED | OUTPATIENT
Start: 2022-12-02 | End: 2022-12-02

## 2022-12-02 RX ORDER — ONDANSETRON 2 MG/ML
4 INJECTION INTRAMUSCULAR; INTRAVENOUS EVERY 4 HOURS PRN
Status: DISCONTINUED | OUTPATIENT
Start: 2022-12-02 | End: 2022-12-06

## 2022-12-02 RX ORDER — MORPHINE SULFATE 2 MG/ML
2 INJECTION, SOLUTION INTRAMUSCULAR; INTRAVENOUS EVERY 2 HOUR PRN
Status: DISCONTINUED | OUTPATIENT
Start: 2022-12-02 | End: 2022-12-06

## 2022-12-02 RX ORDER — METRONIDAZOLE 500 MG/100ML
500 INJECTION, SOLUTION INTRAVENOUS ONCE
Status: DISCONTINUED | OUTPATIENT
Start: 2022-12-02 | End: 2022-12-02

## 2022-12-02 NOTE — TELEPHONE ENCOUNTER
S/P lap hysterectomy on 11/21. Pt calling today indicating she is having \"internal\" pain around her incision, rectal pain and spreading of the bruising around her groin area into her leg. Pt states she is taking Ibuprofen with some relief. Pt states she is having regular BM, is not constipated, denies UTI s/s. Pt states when the pain comes she has the shakes and sweats. Pt denies any flu like s/s. Pt states she is worried about how she is feeling and requesting to be seen.  Pt offered appt with ANSELMO today at 1150 am.

## 2022-12-02 NOTE — TELEPHONE ENCOUNTER
Patient called in she states she had surgery two weeks ago, she is in pain and need for a nurse to call

## 2022-12-02 NOTE — ED INITIAL ASSESSMENT (HPI)
Pt to ED for an abscess found in abdomen during CT. Pt had a hysterectomy 2 weeks ago and dr wants pt to be admitted.

## 2022-12-03 ENCOUNTER — APPOINTMENT (OUTPATIENT)
Dept: ULTRASOUND IMAGING | Facility: HOSPITAL | Age: 42
End: 2022-12-03
Attending: INTERNAL MEDICINE
Payer: COMMERCIAL

## 2022-12-03 LAB
ALBUMIN SERPL-MCNC: 2.7 G/DL (ref 3.4–5)
ALBUMIN/GLOB SERPL: 0.8 {RATIO} (ref 1–2)
ALP LIVER SERPL-CCNC: 130 U/L
ALT SERPL-CCNC: 159 U/L
ANION GAP SERPL CALC-SCNC: 7 MMOL/L (ref 0–18)
AST SERPL-CCNC: 46 U/L (ref 15–37)
BILIRUB SERPL-MCNC: 2.7 MG/DL (ref 0.1–2)
BUN BLD-MCNC: 7 MG/DL (ref 7–18)
BUN/CREAT SERPL: 9.3 (ref 10–20)
CALCIUM BLD-MCNC: 7.7 MG/DL (ref 8.5–10.1)
CHLORIDE SERPL-SCNC: 103 MMOL/L (ref 98–112)
CO2 SERPL-SCNC: 26 MMOL/L (ref 21–32)
CREAT BLD-MCNC: 0.75 MG/DL
GFR SERPLBLD BASED ON 1.73 SQ M-ARVRAT: 101 ML/MIN/1.73M2 (ref 60–?)
GLOBULIN PLAS-MCNC: 3.5 G/DL (ref 2.8–4.4)
GLUCOSE BLD-MCNC: 123 MG/DL (ref 70–99)
OSMOLALITY SERPL CALC.SUM OF ELEC: 281 MOSM/KG (ref 275–295)
POTASSIUM SERPL-SCNC: 3.6 MMOL/L (ref 3.5–5.1)
PROT SERPL-MCNC: 6.2 G/DL (ref 6.4–8.2)
SODIUM SERPL-SCNC: 136 MMOL/L (ref 136–145)

## 2022-12-03 PROCEDURE — 76705 ECHO EXAM OF ABDOMEN: CPT | Performed by: INTERNAL MEDICINE

## 2022-12-03 RX ORDER — ZOLPIDEM TARTRATE 5 MG/1
5 TABLET ORAL NIGHTLY PRN
Status: DISCONTINUED | OUTPATIENT
Start: 2022-12-03 | End: 2022-12-06

## 2022-12-03 RX ORDER — SERTRALINE HYDROCHLORIDE 25 MG/1
25 TABLET, FILM COATED ORAL DAILY
Status: DISCONTINUED | OUTPATIENT
Start: 2022-12-04 | End: 2022-12-06

## 2022-12-03 RX ORDER — IBUPROFEN 600 MG/1
600 TABLET ORAL EVERY 6 HOURS PRN
Status: DISCONTINUED | OUTPATIENT
Start: 2022-12-03 | End: 2022-12-06

## 2022-12-03 RX ORDER — ALBUTEROL SULFATE 90 UG/1
2 AEROSOL, METERED RESPIRATORY (INHALATION) EVERY 6 HOURS PRN
Status: DISCONTINUED | OUTPATIENT
Start: 2022-12-03 | End: 2022-12-06

## 2022-12-03 RX ORDER — METOCLOPRAMIDE HYDROCHLORIDE 5 MG/ML
10 INJECTION INTRAMUSCULAR; INTRAVENOUS EVERY 6 HOURS PRN
Status: DISCONTINUED | OUTPATIENT
Start: 2022-12-03 | End: 2022-12-06

## 2022-12-03 RX ORDER — PANTOPRAZOLE SODIUM 40 MG/1
40 TABLET, DELAYED RELEASE ORAL
Status: DISCONTINUED | OUTPATIENT
Start: 2022-12-03 | End: 2022-12-06

## 2022-12-03 RX ORDER — POLYETHYLENE GLYCOL 3350 17 G/17G
17 POWDER, FOR SOLUTION ORAL DAILY
Status: DISCONTINUED | OUTPATIENT
Start: 2022-12-03 | End: 2022-12-04

## 2022-12-03 RX ORDER — CALCIUM CARBONATE 200(500)MG
500 TABLET,CHEWABLE ORAL EVERY 6 HOURS PRN
Status: DISCONTINUED | OUTPATIENT
Start: 2022-12-03 | End: 2022-12-06

## 2022-12-03 RX ORDER — DOCUSATE SODIUM 100 MG/1
100 CAPSULE, LIQUID FILLED ORAL 2 TIMES DAILY
Status: DISCONTINUED | OUTPATIENT
Start: 2022-12-03 | End: 2022-12-06

## 2022-12-03 NOTE — PLAN OF CARE
Patient from Ed c/o abdominal pain. Post op hysterectomy 2 weeks ago. Admission done,  at bedside. Patient voiding freely, ambulatory. IV antibiotics running. Pain managed with morphine. Safety precautions in place.      Problem: Patient Centered Care  Goal: Patient preferences are identified and integrated in the patient's plan of care  Description: Interventions:  - What would you like us to know as we care for you?   - Provide timely, complete, and accurate information to patient/family  - Incorporate patient and family knowledge, values, beliefs, and cultural backgrounds into the planning and delivery of care  - Encourage patient/family to participate in care and decision-making at the level they choose  - Honor patient and family perspectives and choices  Outcome: Progressing     Problem: Patient/Family Goals  Goal: Patient/Family Long Term Goal  Description: Patient's Long Term Goal:     Interventions:  -   - See additional Care Plan goals for specific interventions  Outcome: Progressing  Goal: Patient/Family Short Term Goal  Description: Patient's Short Term Goal:     Interventions:   - See additional Care Plan goals for specific interventions  Outcome: Progressing

## 2022-12-03 NOTE — ED QUICK NOTES
Orders for admission, patient is aware of plan and ready to go upstairs. Any questions, please call ED RN dale at extension [phone].      Patient Covid vaccination status: Fully vaccinated     COVID Test Ordered in ED: Rapid SARS-CoV-2 by PCR    COVID Suspicion at Admission: N/A    Running Infusions:      Mental Status/LOC at time of transport: AXOX4    Other pertinent information:   CIWA score: N/A   NIH score:  N/A

## 2022-12-03 NOTE — ED QUICK NOTES
Pt to ED c.o abdominal pain and chills/fever at home s/p hysterectomy 2 weeks ago, per OB MD her CT scan shows small abscess and states pt will need to be on long term abx and sent home with PICC line, pt states pain is more tolerable after morphine and that when she feels gas passing through that is when the pain is the worst. AXOX4, no vomiting.

## 2022-12-04 LAB
ALBUMIN SERPL-MCNC: 2.9 G/DL (ref 3.4–5)
ALBUMIN/GLOB SERPL: 0.7 {RATIO} (ref 1–2)
ALP LIVER SERPL-CCNC: 179 U/L
ALT SERPL-CCNC: 113 U/L
ANION GAP SERPL CALC-SCNC: 5 MMOL/L (ref 0–18)
AST SERPL-CCNC: 28 U/L (ref 15–37)
BASOPHILS # BLD AUTO: 0.03 X10(3) UL (ref 0–0.2)
BASOPHILS NFR BLD AUTO: 0.2 %
BILIRUB SERPL-MCNC: 1.9 MG/DL (ref 0.1–2)
BUN BLD-MCNC: 11 MG/DL (ref 7–18)
BUN/CREAT SERPL: 15.7 (ref 10–20)
C DIFF TOX B STL QL: NEGATIVE
CALCIUM BLD-MCNC: 9.1 MG/DL (ref 8.5–10.1)
CHLORIDE SERPL-SCNC: 102 MMOL/L (ref 98–112)
CO2 SERPL-SCNC: 29 MMOL/L (ref 21–32)
CREAT BLD-MCNC: 0.7 MG/DL
DEPRECATED RDW RBC AUTO: 44.9 FL (ref 35.1–46.3)
EOSINOPHIL # BLD AUTO: 0.04 X10(3) UL (ref 0–0.7)
EOSINOPHIL NFR BLD AUTO: 0.2 %
ERYTHROCYTE [DISTWIDTH] IN BLOOD BY AUTOMATED COUNT: 12.8 % (ref 11–15)
GFR SERPLBLD BASED ON 1.73 SQ M-ARVRAT: 109 ML/MIN/1.73M2 (ref 60–?)
GLOBULIN PLAS-MCNC: 4 G/DL (ref 2.8–4.4)
GLUCOSE BLD-MCNC: 122 MG/DL (ref 70–99)
HCT VFR BLD AUTO: 34.9 %
HGB BLD-MCNC: 11.7 G/DL
IMM GRANULOCYTES # BLD AUTO: 0.11 X10(3) UL (ref 0–1)
IMM GRANULOCYTES NFR BLD: 0.6 %
LYMPHOCYTES # BLD AUTO: 0.75 X10(3) UL (ref 1–4)
LYMPHOCYTES NFR BLD AUTO: 4.1 %
MCH RBC QN AUTO: 32.2 PG (ref 26–34)
MCHC RBC AUTO-ENTMCNC: 33.5 G/DL (ref 31–37)
MCV RBC AUTO: 96.1 FL
MONOCYTES # BLD AUTO: 0.72 X10(3) UL (ref 0.1–1)
MONOCYTES NFR BLD AUTO: 3.9 %
NEUTROPHILS # BLD AUTO: 16.86 X10 (3) UL (ref 1.5–7.7)
NEUTROPHILS # BLD AUTO: 16.86 X10(3) UL (ref 1.5–7.7)
NEUTROPHILS NFR BLD AUTO: 91 %
OSMOLALITY SERPL CALC.SUM OF ELEC: 283 MOSM/KG (ref 275–295)
PLATELET # BLD AUTO: 378 10(3)UL (ref 150–450)
POTASSIUM SERPL-SCNC: 3.2 MMOL/L (ref 3.5–5.1)
PROT SERPL-MCNC: 6.9 G/DL (ref 6.4–8.2)
RBC # BLD AUTO: 3.63 X10(6)UL
SODIUM SERPL-SCNC: 136 MMOL/L (ref 136–145)
WBC # BLD AUTO: 18.5 X10(3) UL (ref 4–11)

## 2022-12-04 PROCEDURE — 99232 SBSQ HOSP IP/OBS MODERATE 35: CPT | Performed by: INTERNAL MEDICINE

## 2022-12-04 NOTE — PLAN OF CARE
Marcela Buchanan is A&Ox4. Pain managed with Ibuprofen PRN. Patient with complaints of bloating, indigestion, low appetite. Tums and Protonix ordered by MD. Zofran and Reglan PRN for complaints of nausea. Passing gas, states she has had multipple BMs throughout shift. CDIFF sent, results pending. Isolation initiated. Refusing Heparin. IV Zosyn for abx. Saline locked. Call light in reach. Plan of care reviewed. Problem: Patient Centered Care  Goal: Patient preferences are identified and integrated in the patient's plan of care  Description: Interventions:  - What would you like us to know as we care for you? I recently had surgery here  - Provide timely, complete, and accurate information to patient/family  - Incorporate patient and family knowledge, values, beliefs, and cultural backgrounds into the planning and delivery of care  - Encourage patient/family to participate in care and decision-making at the level they choose  - Honor patient and family perspectives and choices  Outcome: Progressing     Problem: Patient/Family Goals  Goal: Patient/Family Long Term Goal  Description: Patient's Long Term Goal: Return home.  Feel better    Interventions:  -   - See additional Care Plan goals for specific interventions  Outcome: Progressing  Goal: Patient/Family Short Term Goal  Description: Patient's Short Term Goal:     Interventions:   -   - See additional Care Plan goals for specific interventions  Outcome: Progressing

## 2022-12-04 NOTE — PROGRESS NOTES
Patient alert and oriented X4, vitals stable. Continues with IVFs and IV abx. Ibuprofen for pain control. Zofran for nausea. Voiding freely. Ambulating frequently in room and albarran. Regular diet. Loose stools. C-diff negative. Call light within reach, calling appropriately for assistance.

## 2022-12-04 NOTE — PROGRESS NOTES
Patient alert and oriented X4, vitals stable. Continues with IVFs and IV abx. Morphine and Ibuprofen for pain control. Zofran for nausea. Reglan now ordered as well. Voiding freely. Ambulating frequently in room. Regular diet. Call light within reach, calling appropriately for assistance.

## 2022-12-05 ENCOUNTER — APPOINTMENT (OUTPATIENT)
Dept: CT IMAGING | Facility: HOSPITAL | Age: 42
End: 2022-12-05
Attending: INTERNAL MEDICINE
Payer: COMMERCIAL

## 2022-12-05 ENCOUNTER — APPOINTMENT (OUTPATIENT)
Dept: CT IMAGING | Facility: HOSPITAL | Age: 42
End: 2022-12-05
Attending: CLINICAL NURSE SPECIALIST
Payer: COMMERCIAL

## 2022-12-05 LAB
ALBUMIN SERPL-MCNC: 3 G/DL (ref 3.4–5)
ALBUMIN/GLOB SERPL: 0.8 {RATIO} (ref 1–2)
ALP LIVER SERPL-CCNC: 184 U/L
ALT SERPL-CCNC: 90 U/L
ANION GAP SERPL CALC-SCNC: 5 MMOL/L (ref 0–18)
AST SERPL-CCNC: 22 U/L (ref 15–37)
BASOPHILS # BLD AUTO: 0.03 X10(3) UL (ref 0–0.2)
BASOPHILS NFR BLD AUTO: 0.3 %
BILIRUB SERPL-MCNC: 1.6 MG/DL (ref 0.1–2)
BUN BLD-MCNC: 9 MG/DL (ref 7–18)
BUN/CREAT SERPL: 12.7 (ref 10–20)
CALCIUM BLD-MCNC: 9.3 MG/DL (ref 8.5–10.1)
CHLORIDE SERPL-SCNC: 101 MMOL/L (ref 98–112)
CO2 SERPL-SCNC: 29 MMOL/L (ref 21–32)
CREAT BLD-MCNC: 0.71 MG/DL
DEPRECATED RDW RBC AUTO: 45.1 FL (ref 35.1–46.3)
EOSINOPHIL # BLD AUTO: 0.09 X10(3) UL (ref 0–0.7)
EOSINOPHIL NFR BLD AUTO: 0.8 %
ERYTHROCYTE [DISTWIDTH] IN BLOOD BY AUTOMATED COUNT: 12.6 % (ref 11–15)
GFR SERPLBLD BASED ON 1.73 SQ M-ARVRAT: 107 ML/MIN/1.73M2 (ref 60–?)
GLOBULIN PLAS-MCNC: 3.9 G/DL (ref 2.8–4.4)
GLUCOSE BLD-MCNC: 98 MG/DL (ref 70–99)
HCT VFR BLD AUTO: 35 %
HGB BLD-MCNC: 11.4 G/DL
IMM GRANULOCYTES # BLD AUTO: 0.1 X10(3) UL (ref 0–1)
IMM GRANULOCYTES NFR BLD: 0.8 %
LYMPHOCYTES # BLD AUTO: 0.86 X10(3) UL (ref 1–4)
LYMPHOCYTES NFR BLD AUTO: 7.3 %
MCH RBC QN AUTO: 31.6 PG (ref 26–34)
MCHC RBC AUTO-ENTMCNC: 32.6 G/DL (ref 31–37)
MCV RBC AUTO: 97 FL
MONOCYTES # BLD AUTO: 0.76 X10(3) UL (ref 0.1–1)
MONOCYTES NFR BLD AUTO: 6.5 %
NEUTROPHILS # BLD AUTO: 9.94 X10 (3) UL (ref 1.5–7.7)
NEUTROPHILS # BLD AUTO: 9.94 X10(3) UL (ref 1.5–7.7)
NEUTROPHILS NFR BLD AUTO: 84.3 %
OSMOLALITY SERPL CALC.SUM OF ELEC: 279 MOSM/KG (ref 275–295)
PLATELET # BLD AUTO: 447 10(3)UL (ref 150–450)
POTASSIUM SERPL-SCNC: 3 MMOL/L (ref 3.5–5.1)
POTASSIUM SERPL-SCNC: 3.3 MMOL/L (ref 3.5–5.1)
PROT SERPL-MCNC: 6.9 G/DL (ref 6.4–8.2)
RBC # BLD AUTO: 3.61 X10(6)UL
SODIUM SERPL-SCNC: 135 MMOL/L (ref 136–145)
WBC # BLD AUTO: 11.8 X10(3) UL (ref 4–11)

## 2022-12-05 PROCEDURE — 74177 CT ABD & PELVIS W/CONTRAST: CPT | Performed by: INTERNAL MEDICINE

## 2022-12-05 PROCEDURE — 99232 SBSQ HOSP IP/OBS MODERATE 35: CPT | Performed by: INTERNAL MEDICINE

## 2022-12-05 PROCEDURE — 77012 CT SCAN FOR NEEDLE BIOPSY: CPT | Performed by: CLINICAL NURSE SPECIALIST

## 2022-12-05 PROCEDURE — 0W9J3ZZ DRAINAGE OF PELVIC CAVITY, PERCUTANEOUS APPROACH: ICD-10-PCS | Performed by: RADIOLOGY

## 2022-12-05 PROCEDURE — 99152 MOD SED SAME PHYS/QHP 5/>YRS: CPT | Performed by: CLINICAL NURSE SPECIALIST

## 2022-12-05 PROCEDURE — 10160 PNXR ASPIR ABSC HMTMA BULLA: CPT | Performed by: CLINICAL NURSE SPECIALIST

## 2022-12-05 RX ORDER — MIDAZOLAM HYDROCHLORIDE 1 MG/ML
1 INJECTION INTRAMUSCULAR; INTRAVENOUS EVERY 5 MIN PRN
Status: DISCONTINUED | OUTPATIENT
Start: 2022-12-05 | End: 2022-12-05

## 2022-12-05 RX ORDER — FLUMAZENIL 0.1 MG/ML
0.2 INJECTION INTRAVENOUS AS NEEDED
Status: DISCONTINUED | OUTPATIENT
Start: 2022-12-05 | End: 2022-12-05

## 2022-12-05 RX ORDER — ONDANSETRON 4 MG/1
4 TABLET, ORALLY DISINTEGRATING ORAL EVERY 6 HOURS PRN
Qty: 30 TABLET | Refills: 0 | Status: SHIPPED | OUTPATIENT
Start: 2022-12-05

## 2022-12-05 RX ORDER — POTASSIUM CHLORIDE 20 MEQ/1
40 TABLET, EXTENDED RELEASE ORAL EVERY 4 HOURS
Status: DISPENSED | OUTPATIENT
Start: 2022-12-05 | End: 2022-12-05

## 2022-12-05 RX ORDER — MIDAZOLAM HYDROCHLORIDE 1 MG/ML
INJECTION INTRAMUSCULAR; INTRAVENOUS
Status: COMPLETED
Start: 2022-12-05 | End: 2022-12-05

## 2022-12-05 RX ORDER — NALOXONE HYDROCHLORIDE 0.4 MG/ML
80 INJECTION, SOLUTION INTRAMUSCULAR; INTRAVENOUS; SUBCUTANEOUS AS NEEDED
Status: DISCONTINUED | OUTPATIENT
Start: 2022-12-05 | End: 2022-12-05

## 2022-12-05 RX ORDER — SODIUM CHLORIDE 9 MG/ML
INJECTION, SOLUTION INTRAVENOUS CONTINUOUS
Status: DISCONTINUED | OUTPATIENT
Start: 2022-12-05 | End: 2022-12-05

## 2022-12-05 RX ORDER — TRAMADOL HYDROCHLORIDE 50 MG/1
50 TABLET ORAL EVERY 6 HOURS PRN
Status: DISCONTINUED | OUTPATIENT
Start: 2022-12-05 | End: 2022-12-06

## 2022-12-05 RX ORDER — IBUPROFEN 600 MG/1
TABLET ORAL
Qty: 30 TABLET | Refills: 0 | Status: SHIPPED | OUTPATIENT
Start: 2022-12-05

## 2022-12-05 RX ORDER — POTASSIUM CHLORIDE 20 MEQ/1
40 TABLET, EXTENDED RELEASE ORAL ONCE
Status: COMPLETED | OUTPATIENT
Start: 2022-12-05 | End: 2022-12-06

## 2022-12-05 RX ORDER — SIMETHICONE 80 MG
80 TABLET,CHEWABLE ORAL EVERY 6 HOURS PRN
Status: DISCONTINUED | OUTPATIENT
Start: 2022-12-05 | End: 2022-12-06

## 2022-12-05 NOTE — PLAN OF CARE
Kate Migueljimmy is A&Ox4. Pain managed with Ibuprofen PRN. Zofran and Tums for complaints of nausea/bloating. Refusing Heparin. IV Zosyn for abx. Saline locked. Call light in reach. Plan of care reviewed. Problem: Patient Centered Care  Goal: Patient preferences are identified and integrated in the patient's plan of care  Description: Interventions:  - What would you like us to know as we care for you? I recently had surgery here  - Provide timely, complete, and accurate information to patient/family  - Incorporate patient and family knowledge, values, beliefs, and cultural backgrounds into the planning and delivery of care  - Encourage patient/family to participate in care and decision-making at the level they choose  - Honor patient and family perspectives and choices  Outcome: Progressing     Problem: Patient/Family Goals  Goal: Patient/Family Long Term Goal  Description: Patient's Long Term Goal: Return home.  Feel better    Interventions:  -   - See additional Care Plan goals for specific interventions  Outcome: Progressing  Goal: Patient/Family Short Term Goal  Description: Patient's Short Term Goal:     Interventions:   -   - See additional Care Plan goals for specific interventions  Outcome: Progressing

## 2022-12-05 NOTE — CM/SW NOTE
12/05/22 1500   CM/SW Referral Data   Referral Source Social Work (self-referral)   Reason for Referral Discharge planning   Informant Patient   Pertinent Medical Hx   Does patient have an established PCP? Yes   Patient Info   Patient's Current Mental Status at Time of Assessment Alert;Oriented   Patient's [address]   Patient lives with Spouse/Significant other   Patient Status Prior to Admission   Independent with ADLs and Mobility Yes   Discharge Needs   Anticipated D/C needs Infusion care       SW received call from [phone] Jayesh U[address]e at  [facility] [phone]  indicating that patient will likely require IV abx upon DC. Notified that patient has coverage for all options. (Needs to be home bound for home health). SW met with patient at bedside. Introduced self and role. Patient alert and oriented at time of visit. Patient lives with spouse at ( address correct on face sheet). Patient is independent with ADL's, works full time. SW discussed IV abx options, patient reports that she prefers teach and train option at [facility]. SAMIRA called and notified RN Nemo Ortiz at [facility], will need finalized rx in order to secure appointment. Updated ID team, requested final rx to be sent to [facility]. PLAN: Likely will require IV abx upon DC. Prefers [address] for Te[facility] and [facility]. *Need finalized IV abx rx in order to secure appointment.        NELLY Montero, [facility]    [id number]

## 2022-12-06 ENCOUNTER — APPOINTMENT (OUTPATIENT)
Dept: PICC SERVICES | Facility: HOSPITAL | Age: 42
End: 2022-12-06
Attending: PHYSICIAN ASSISTANT
Payer: COMMERCIAL

## 2022-12-06 VITALS
TEMPERATURE: 98 F | DIASTOLIC BLOOD PRESSURE: 91 MMHG | HEART RATE: 86 BPM | BODY MASS INDEX: 27 KG/M2 | WEIGHT: 148.56 LBS | OXYGEN SATURATION: 100 % | SYSTOLIC BLOOD PRESSURE: 131 MMHG | RESPIRATION RATE: 18 BRPM

## 2022-12-06 LAB
ALBUMIN SERPL-MCNC: 2.7 G/DL (ref 3.4–5)
ALBUMIN/GLOB SERPL: 0.7 {RATIO} (ref 1–2)
ALP LIVER SERPL-CCNC: 166 U/L
ALT SERPL-CCNC: 66 U/L
ANION GAP SERPL CALC-SCNC: 8 MMOL/L (ref 0–18)
AST SERPL-CCNC: 12 U/L (ref 15–37)
BASOPHILS # BLD AUTO: 0.04 X10(3) UL (ref 0–0.2)
BASOPHILS NFR BLD AUTO: 0.4 %
BILIRUB SERPL-MCNC: 1.3 MG/DL (ref 0.1–2)
BUN BLD-MCNC: 8 MG/DL (ref 7–18)
BUN/CREAT SERPL: 13.8 (ref 10–20)
CALCIUM BLD-MCNC: 8.9 MG/DL (ref 8.5–10.1)
CHLORIDE SERPL-SCNC: 100 MMOL/L (ref 98–112)
CO2 SERPL-SCNC: 28 MMOL/L (ref 21–32)
CREAT BLD-MCNC: 0.58 MG/DL
DEPRECATED RDW RBC AUTO: 43 FL (ref 35.1–46.3)
EOSINOPHIL # BLD AUTO: 0.07 X10(3) UL (ref 0–0.7)
EOSINOPHIL NFR BLD AUTO: 0.7 %
ERYTHROCYTE [DISTWIDTH] IN BLOOD BY AUTOMATED COUNT: 12.5 % (ref 11–15)
GFR SERPLBLD BASED ON 1.73 SQ M-ARVRAT: 114 ML/MIN/1.73M2 (ref 60–?)
GLOBULIN PLAS-MCNC: 3.8 G/DL (ref 2.8–4.4)
GLUCOSE BLD-MCNC: 91 MG/DL (ref 70–99)
HCT VFR BLD AUTO: 31.2 %
HGB BLD-MCNC: 10.7 G/DL
IMM GRANULOCYTES # BLD AUTO: 0.1 X10(3) UL (ref 0–1)
IMM GRANULOCYTES NFR BLD: 1 %
LYMPHOCYTES # BLD AUTO: 0.95 X10(3) UL (ref 1–4)
LYMPHOCYTES NFR BLD AUTO: 9.8 %
MCH RBC QN AUTO: 32 PG (ref 26–34)
MCHC RBC AUTO-ENTMCNC: 34.3 G/DL (ref 31–37)
MCV RBC AUTO: 93.4 FL
MONOCYTES # BLD AUTO: 0.77 X10(3) UL (ref 0.1–1)
MONOCYTES NFR BLD AUTO: 8 %
NEUTROPHILS # BLD AUTO: 7.74 X10 (3) UL (ref 1.5–7.7)
NEUTROPHILS # BLD AUTO: 7.74 X10(3) UL (ref 1.5–7.7)
NEUTROPHILS NFR BLD AUTO: 80.1 %
OSMOLALITY SERPL CALC.SUM OF ELEC: 280 MOSM/KG (ref 275–295)
PLATELET # BLD AUTO: 418 10(3)UL (ref 150–450)
POTASSIUM SERPL-SCNC: 4.1 MMOL/L (ref 3.5–5.1)
PROT SERPL-MCNC: 6.5 G/DL (ref 6.4–8.2)
RBC # BLD AUTO: 3.34 X10(6)UL
SODIUM SERPL-SCNC: 136 MMOL/L (ref 136–145)
WBC # BLD AUTO: 9.7 X10(3) UL (ref 4–11)

## 2022-12-06 PROCEDURE — 99239 HOSP IP/OBS DSCHRG MGMT >30: CPT | Performed by: INTERNAL MEDICINE

## 2022-12-06 PROCEDURE — 02HV33Z INSERTION OF INFUSION DEVICE INTO SUPERIOR VENA CAVA, PERCUTANEOUS APPROACH: ICD-10-PCS | Performed by: INTERNAL MEDICINE

## 2022-12-06 PROCEDURE — B5181ZA FLUOROSCOPY OF SUPERIOR VENA CAVA USING LOW OSMOLAR CONTRAST, GUIDANCE: ICD-10-PCS | Performed by: INTERNAL MEDICINE

## 2022-12-06 RX ORDER — IBUPROFEN 600 MG/1
600 TABLET ORAL EVERY 6 HOURS PRN
Qty: 30 TABLET | Refills: 0 | Status: SHIPPED | OUTPATIENT
Start: 2022-12-06 | End: 2022-12-12

## 2022-12-06 RX ORDER — TRAMADOL HYDROCHLORIDE 50 MG/1
50 TABLET ORAL EVERY 8 HOURS PRN
Qty: 30 TABLET | Refills: 0 | Status: SHIPPED | OUTPATIENT
Start: 2022-12-06 | End: 2022-12-12

## 2022-12-06 NOTE — PROGRESS NOTES
Patient alert and oriented X4, vitals stable. Pain controlled with ibuprofen. Patient sent for Ct abdomen/pelvis then to IR for an abscess drainage. Returned stable. Continues with IV abx. Simethicone ordered for gas pains. Ambulating in room and albarran. Clear liquid diet. Call light within reach, calling appropriately for assistance.

## 2022-12-06 NOTE — PLAN OF CARE
Patient is alert and oriented x4, on RA. Getting heparin subcutaneous, pt been refusing administration. Had BM during shift. On clear liquid diet, having some nausea, managed with zofran prn. Voiding, up to bathroom, 1x-assist/self. Abdomen area bruised. Pain managed with motrin prn, and ordered per MD tramadol prn to further assist in pain management. IV zosyn abx. Replaced K per protocol. Plan poss for IV abx on DC. Plan pending, will continue to monitor. Problem: Patient Centered Care  Goal: Patient preferences are identified and integrated in the patient's plan of care  Description: Interventions:  - What would you like us to know as we care for you?  From home with family  - Provide timely, complete, and accurate information to patient/family  - Incorporate patient and family knowledge, values, beliefs, and cultural backgrounds into the planning and delivery of care  - Encourage patient/family to participate in care and decision-making at the level they choose  - Honor patient and family perspectives and choices  Outcome: Progressing     Problem: Patient/Family Goals  Goal: Patient/Family Long Term Goal  Description: Patient's Long Term Goal: To manage condition, get better, and go home    Interventions:  -Monitor labs, results, and vitals  -Administer medication as prescribed and prn  -Pain management   -Prevent infection  -I&Os  -Imaging/tests/consults  -follow plan of care  -IV abx  -Wound care  -encourg amb  -Diet, hygiene, safety, act pb  -Manage for worsening condition and new onset of symptoms  - See additional Care Plan goals for specific interventions  Outcome: Progressing  Goal: Patient/Family Short Term Goal  Description: Patient's Short Term Goal: Pain management    Interventions:   -Assess pain level  -encourage pt to notify of increasing pain level  -Administer pain med as ordered and prn  -Provide non-pharmacological intervention as needed  -Follow plan of care  - See additional Care Plan goals for specific interventions  Outcome: Progressing     Problem: PAIN - ADULT  Goal: Verbalizes/displays adequate comfort level or patient's stated pain goal  Description: INTERVENTIONS:  - Encourage pt to monitor pain and request assistance  - Assess pain using appropriate pain scale  - Administer analgesics based on type and severity of pain and evaluate response  - Implement non-pharmacological measures as appropriate and evaluate response  - Consider cultural and social influences on pain and pain management  - Manage/alleviate anxiety  - Utilize distraction and/or relaxation techniques  - Monitor for opioid side effects  - Notify MD/LIP if interventions unsuccessful or patient reports new pain  - Anticipate increased pain with activity and pre-medicate as appropriate  Outcome: Progressing     Problem: RISK FOR INFECTION - ADULT  Goal: Absence of fever/infection during anticipated neutropenic period  Description: INTERVENTIONS  - Monitor WBC  - Administer growth factors as ordered  - Implement neutropenic guidelines  Outcome: Progressing     Problem: SAFETY ADULT - FALL  Goal: Free from fall injury  Description: INTERVENTIONS:  - Assess pt frequently for physical needs  - Identify cognitive and physical deficits and behaviors that affect risk of falls.   - [place] fall precautions as indicated by assessment.  - Educate pt/family on patient safety including physical limitations  - Instruct pt to call for assistance with activity based on assessment  - Modify environment to reduce risk of injury  - Provide assistive devices as appropriate  - Consider OT/PT consult to assist with strengthening/mobility  - Encourage toileting schedule  Outcome: Progressing     Problem: GASTROINTESTINAL - ADULT  Goal: Minimal or absence of nausea and vomiting  Description: INTERVENTIONS:  - Maintain adequate hydration with IV or PO as ordered and tolerated  - Nasogastric tube to low intermittent suction as ordered  - Evaluate effectiveness of ordered antiemetic medications  - Provide nonpharmacologic comfort measures as appropriate  - Advance diet as tolerated, if ordered  - Obtain nutritional consult as needed  - Evaluate fluid balance  Outcome: Progressing  Goal: Maintains or returns to baseline bowel function  Description: INTERVENTIONS:  - Assess bowel function  - Maintain adequate hydration with IV or PO as ordered and tolerated  - Evaluate effectiveness of GI medications  - Encourage mobilization and activity  - Obtain nutritional consult as needed  - Establish a toileting routine/schedule  - Consider collaborating with pharmacy to review patient's medication profile  Outcome: Progressing     Problem: METABOLIC/FLUID AND ELECTROLYTES - ADULT  Goal: Electrolytes maintained within normal limits  Description: INTERVENTIONS:  - Monitor labs and rhythm and assess patient for signs and symptoms of electrolyte imbalances  - Administer electrolyte replacement as ordered  - Monitor response to electrolyte replacements, including rhythm and repeat lab results as appropriate  - Fluid restriction as ordered  - Instruct patient on fluid and nutrition restrictions as appropriate  Outcome: Progressing     Problem: SKIN/TISSUE INTEGRITY - ADULT  Goal: Skin integrity remains intact  Description: INTERVENTIONS  - Assess and document risk factors for pressure ulcer development  - Assess and document skin integrity  - Monitor for areas of redness and/or skin breakdown  - Initiate interventions, skin care algorithm/standards of care as needed  Outcome: Progressing  Goal: Incision(s), wounds(s) or drain site(s) healing without S/S of infection  Description: INTERVENTIONS:  - Assess and document risk factors for pressure ulcer development  - Assess and document skin integrity  - Assess and document dressing/incision, wound bed, drain sites and surrounding tissue  - Implement wound care per orders  - Initiate isolation precautions as appropriate  - Initiate Pressure Ulcer prevention bundle as indicated  Outcome: Progressing     Problem: HEMATOLOGIC - ADULT  Goal: Maintains hematologic stability  Description: INTERVENTIONS  - Assess for signs and symptoms of bleeding or hemorrhage  - Monitor labs and vital signs for trends  - Administer supportive blood products/factors, fluids and medications as ordered and appropriate  - Administer supportive blood products/factors as ordered and appropriate  Outcome: Progressing  Goal: Free from bleeding injury  Description: (Example usage: patient with low platelets)  INTERVENTIONS:  - Avoid intramuscular injections, enemas and rectal medication administration  - Ensure safe mobilization of patient  - Hold pressure on venipuncture sites to achieve adequate hemostasis  - Assess for signs and symptoms of internal bleeding  - Monitor lab trends  - Patient is to report abnormal signs of bleeding to staff  - Avoid use of toothpicks and dental floss  - Use electric shaver for shaving  - Use soft bristle tooth brush  - Limit straining and forceful nose blowing  Outcome: Progressing     Problem: MUSCULOSKELETAL - ADULT  Goal: Return mobility to safest level of function  Description: INTERVENTIONS:  - Assess patient stability and activity tolerance for standing, transferring and ambulating w/ or w/o assistive devices  - Assist with transfers and ambulation using safe patient handling equipment as needed  - Ensure adequate protection for wounds/incisions during mobilization  - Obtain PT/OT consults as needed  - Advance activity as appropriate  - Communicate ordered activity level and limitations with patient/family  Outcome: Progressing

## 2022-12-06 NOTE — DISCHARGE INSTRUCTIONS
[facility], [address] [facility], [place]  Ph: [phone]  First appointment: 12/7/22 at 1:30pm

## 2022-12-06 NOTE — PLAN OF CARE
Patient is alert and oriented. Pain well managed with medication regimen. PICC line placed and first dose of Invanz given. Tolerated by patient. Discharge education given and patient discharged home with spouse. Problem: Patient Centered Care  Goal: Patient preferences are identified and integrated in the patient's plan of care  Description: Interventions:  - What would you like us to know as we care for you?  From home with family  - Provide timely, complete, and accurate information to patient/family  - Incorporate patient and family knowledge, values, beliefs, and cultural backgrounds into the planning and delivery of care  - Encourage patient/family to participate in care and decision-making at the level they choose  - Honor patient and family perspectives and choices  Outcome: Progressing     Problem: Patient/Family Goals  Goal: Patient/Family Long Term Goal  Description: Patient's Long Term Goal: To manage condition, get better, and go home    Interventions:  -Monitor labs, results, and vitals  -Administer medication as prescribed and prn  -Pain management   -Prevent infection  -I&Os  -Imaging/tests/consults  -follow plan of care  -IV abx  -Wound care  -encourg amb  -Diet, hygiene, safety, act pb  -Manage for worsening condition and new onset of symptoms  - See additional Care Plan goals for specific interventions  Outcome: Progressing  Goal: Patient/Family Short Term Goal  Description: Patient's Short Term Goal: Pain management    Interventions:   -Assess pain level  -encourage pt to notify of increasing pain level  -Administer pain med as ordered and prn  -Provide non-pharmacological intervention as needed  -Follow plan of care  - See additional Care Plan goals for specific interventions  Outcome: Progressing     Problem: PAIN - ADULT  Goal: Verbalizes/displays adequate comfort level or patient's stated pain goal  Description: INTERVENTIONS:  - Encourage pt to monitor pain and request assistance  - Assess pain using appropriate pain scale  - Administer analgesics based on type and severity of pain and evaluate response  - Implement non-pharmacological measures as appropriate and evaluate response  - Consider cultural and social influences on pain and pain management  - Manage/alleviate anxiety  - Utilize distraction and/or relaxation techniques  - Monitor for opioid side effects  - Notify MD/LIP if interventions unsuccessful or patient reports new pain  - Anticipate increased pain with activity and pre-medicate as appropriate  Outcome: Progressing     Problem: RISK FOR INFECTION - ADULT  Goal: Absence of fever/infection during anticipated neutropenic period  Description: INTERVENTIONS  - Monitor WBC  - Administer growth factors as ordered  - Implement neutropenic guidelines  Outcome: Progressing     Problem: SAFETY ADULT - FALL  Goal: Free from fall injury  Description: INTERVENTIONS:  - Assess pt frequently for physical needs  - Identify cognitive and physical deficits and behaviors that affect risk of falls.   - [place] fall precautions as indicated by assessment.  - Educate pt/family on patient safety including physical limitations  - Instruct pt to call for assistance with activity based on assessment  - Modify environment to reduce risk of injury  - Provide assistive devices as appropriate  - Consider OT/PT consult to assist with strengthening/mobility  - Encourage toileting schedule  Outcome: Progressing     Problem: DISCHARGE PLANNING  Goal: Discharge to home or other facility with appropriate resources  Description: INTERVENTIONS:  - Identify barriers to discharge w/pt and caregiver  - Include patient/family/discharge partner in discharge planning  - Arrange for needed discharge resources and transportation as appropriate  - Identify discharge learning needs (meds, wound care, etc)  - Arrange for interpreters to assist at discharge as needed  - Consider post-discharge preferences of patient/family/discharge partner  - Complete POLST form as appropriate  - Assess patient's ability to be responsible for managing their own health  - Refer to Case Management Department for coordinating discharge planning if the patient needs post-hospital services based on physician/LIP order or complex needs related to functional status, cognitive ability or social support system  Outcome: Progressing     Problem: GASTROINTESTINAL - ADULT  Goal: Minimal or absence of nausea and vomiting  Description: INTERVENTIONS:  - Maintain adequate hydration with IV or PO as ordered and tolerated  - Nasogastric tube to low intermittent suction as ordered  - Evaluate effectiveness of ordered antiemetic medications  - Provide nonpharmacologic comfort measures as appropriate  - Advance diet as tolerated, if ordered  - Obtain nutritional consult as needed  - Evaluate fluid balance  Outcome: Progressing  Goal: Maintains or returns to baseline bowel function  Description: INTERVENTIONS:  - Assess bowel function  - Maintain adequate hydration with IV or PO as ordered and tolerated  - Evaluate effectiveness of GI medications  - Encourage mobilization and activity  - Obtain nutritional consult as needed  - Establish a toileting routine/schedule  - Consider collaborating with pharmacy to review patient's medication profile  Outcome: Progressing     Problem: METABOLIC/FLUID AND ELECTROLYTES - ADULT  Goal: Electrolytes maintained within normal limits  Description: INTERVENTIONS:  - Monitor labs and rhythm and assess patient for signs and symptoms of electrolyte imbalances  - Administer electrolyte replacement as ordered  - Monitor response to electrolyte replacements, including rhythm and repeat lab results as appropriate  - Fluid restriction as ordered  - Instruct patient on fluid and nutrition restrictions as appropriate  Outcome: Progressing     Problem: SKIN/TISSUE INTEGRITY - ADULT  Goal: Skin integrity remains intact  Description: INTERVENTIONS  - Assess and document risk factors for pressure ulcer development  - Assess and document skin integrity  - Monitor for areas of redness and/or skin breakdown  - Initiate interventions, skin care algorithm/standards of care as needed  Outcome: Progressing  Goal: Incision(s), wounds(s) or drain site(s) healing without S/S of infection  Description: INTERVENTIONS:  - Assess and document risk factors for pressure ulcer development  - Assess and document skin integrity  - Assess and document dressing/incision, wound bed, drain sites and surrounding tissue  - Implement wound care per orders  - Initiate isolation precautions as appropriate  - Initiate Pressure Ulcer prevention bundle as indicated  Outcome: Progressing     Problem: HEMATOLOGIC - ADULT  Goal: Maintains hematologic stability  Description: INTERVENTIONS  - Assess for signs and symptoms of bleeding or hemorrhage  - Monitor labs and vital signs for trends  - Administer supportive blood products/factors, fluids and medications as ordered and appropriate  - Administer supportive blood products/factors as ordered and appropriate  Outcome: Progressing  Goal: Free from bleeding injury  Description: (Example usage: patient with low platelets)  INTERVENTIONS:  - Avoid intramuscular injections, enemas and rectal medication administration  - Ensure safe mobilization of patient  - Hold pressure on venipuncture sites to achieve adequate hemostasis  - Assess for signs and symptoms of internal bleeding  - Monitor lab trends  - Patient is to report abnormal signs of bleeding to staff  - Avoid use of toothpicks and dental floss  - Use electric shaver for shaving  - Use soft bristle tooth brush  - Limit straining and forceful nose blowing  Outcome: Progressing     Problem: MUSCULOSKELETAL - ADULT  Goal: Return mobility to safest level of function  Description: INTERVENTIONS:  - Assess patient stability and activity tolerance for standing, transferring and ambulating w/ or w/o assistive devices  - Assist with transfers and ambulation using safe patient handling equipment as needed  - Ensure adequate protection for wounds/incisions during mobilization  - Obtain PT/OT consults as needed  - Advance activity as appropriate  - Communicate ordered activity level and limitations with patient/family  Outcome: Progressing

## 2022-12-07 ENCOUNTER — PATIENT OUTREACH (OUTPATIENT)
Dept: CASE MANAGEMENT | Age: 42
End: 2022-12-07

## 2022-12-07 DIAGNOSIS — Z02.9 ENCOUNTERS FOR UNSPECIFIED ADMINISTRATIVE PURPOSE: ICD-10-CM

## 2022-12-12 ENCOUNTER — OFFICE VISIT (OUTPATIENT)
Dept: INTERNAL MEDICINE CLINIC | Facility: CLINIC | Age: 42
End: 2022-12-12
Payer: COMMERCIAL

## 2022-12-12 VITALS
TEMPERATURE: 98 F | WEIGHT: 141 LBS | OXYGEN SATURATION: 99 % | DIASTOLIC BLOOD PRESSURE: 78 MMHG | HEART RATE: 88 BPM | BODY MASS INDEX: 25.95 KG/M2 | HEIGHT: 62 IN | SYSTOLIC BLOOD PRESSURE: 122 MMHG

## 2022-12-12 DIAGNOSIS — L02.91 ABSCESS: Primary | ICD-10-CM

## 2022-12-12 PROCEDURE — 3074F SYST BP LT 130 MM HG: CPT | Performed by: INTERNAL MEDICINE

## 2022-12-12 PROCEDURE — 3008F BODY MASS INDEX DOCD: CPT | Performed by: INTERNAL MEDICINE

## 2022-12-12 PROCEDURE — 3078F DIAST BP <80 MM HG: CPT | Performed by: INTERNAL MEDICINE

## 2022-12-12 PROCEDURE — 99213 OFFICE O/P EST LOW 20 MIN: CPT | Performed by: INTERNAL MEDICINE

## 2022-12-19 ENCOUNTER — OFFICE VISIT (OUTPATIENT)
Dept: OBGYN CLINIC | Facility: CLINIC | Age: 42
End: 2022-12-19
Payer: COMMERCIAL

## 2022-12-19 ENCOUNTER — HOSPITAL ENCOUNTER (OUTPATIENT)
Dept: CT IMAGING | Facility: HOSPITAL | Age: 42
Discharge: HOME | End: 2022-12-19
Attending: PHYSICIAN ASSISTANT
Payer: COMMERCIAL

## 2022-12-19 VITALS
WEIGHT: 142.19 LBS | BODY MASS INDEX: 26 KG/M2 | DIASTOLIC BLOOD PRESSURE: 75 MMHG | SYSTOLIC BLOOD PRESSURE: 111 MMHG | HEART RATE: 92 BPM

## 2022-12-19 DIAGNOSIS — T81.43XA POSTOPERATIVE INTRA-ABDOMINAL ABSCESS: ICD-10-CM

## 2022-12-19 DIAGNOSIS — N73.9 PELVIC ABSCESS IN FEMALE: ICD-10-CM

## 2022-12-19 DIAGNOSIS — Z98.890 POST-OPERATIVE STATE: Primary | ICD-10-CM

## 2022-12-19 PROCEDURE — 74177 CT ABD & PELVIS W/CONTRAST: CPT | Performed by: PHYSICIAN ASSISTANT

## 2022-12-19 PROCEDURE — 99024 POSTOP FOLLOW-UP VISIT: CPT | Performed by: OBSTETRICS & GYNECOLOGY

## 2022-12-19 PROCEDURE — 3074F SYST BP LT 130 MM HG: CPT | Performed by: OBSTETRICS & GYNECOLOGY

## 2022-12-19 PROCEDURE — 3078F DIAST BP <80 MM HG: CPT | Performed by: OBSTETRICS & GYNECOLOGY

## 2022-12-28 NOTE — PROGRESS NOTES
POST OP EXAM: She is markedly improved since IV antibiotic treatment for the post op abscess. She has CT scheduled today with ID appt to follow. Pain is less. Scant brown DC. She is having some contsipation but no significant urinary complaints. PE:  Incisions healed well with ecchymossis nearly resolved except mons area. Abdomen soft without tenderness. Pelvic: EG, vagina and cuff without lesions and cuff healing well with suture present. Bimanual shows no mass and mild tenderness. IMP: Normal exam post treatment for abscess    PLAN: We'll await ID recs. Guidance on activity.

## 2023-01-18 ENCOUNTER — TELEPHONE (OUTPATIENT)
Dept: INTERNAL MEDICINE CLINIC | Facility: CLINIC | Age: 43
End: 2023-01-18

## 2023-01-18 ENCOUNTER — OFFICE VISIT (OUTPATIENT)
Dept: INTERNAL MEDICINE CLINIC | Facility: CLINIC | Age: 43
End: 2023-01-18

## 2023-01-18 ENCOUNTER — HOSPITAL ENCOUNTER (OUTPATIENT)
Dept: CT IMAGING | Facility: HOSPITAL | Age: 43
Discharge: HOME | End: 2023-01-18
Attending: INTERNAL MEDICINE
Payer: COMMERCIAL

## 2023-01-18 VITALS
SYSTOLIC BLOOD PRESSURE: 118 MMHG | BODY MASS INDEX: 26.31 KG/M2 | HEART RATE: 82 BPM | HEIGHT: 62 IN | OXYGEN SATURATION: 100 % | TEMPERATURE: 98 F | DIASTOLIC BLOOD PRESSURE: 75 MMHG | WEIGHT: 143 LBS

## 2023-01-18 DIAGNOSIS — R10.33 PERIUMBILICAL PAIN: ICD-10-CM

## 2023-01-18 DIAGNOSIS — N73.9 PELVIC ABSCESS IN FEMALE: ICD-10-CM

## 2023-01-18 DIAGNOSIS — A09 DIARRHEA OF INFECTIOUS ORIGIN: ICD-10-CM

## 2023-01-18 DIAGNOSIS — N73.9 PELVIC ABSCESS IN FEMALE: Primary | ICD-10-CM

## 2023-01-18 LAB
CREAT BLD-MCNC: 0.7 MG/DL
GFR SERPLBLD BASED ON 1.73 SQ M-ARVRAT: 109 ML/MIN/1.73M2 (ref 60–?)

## 2023-01-18 PROCEDURE — 82565 ASSAY OF CREATININE: CPT

## 2023-01-18 PROCEDURE — 3074F SYST BP LT 130 MM HG: CPT | Performed by: INTERNAL MEDICINE

## 2023-01-18 PROCEDURE — 74177 CT ABD & PELVIS W/CONTRAST: CPT | Performed by: INTERNAL MEDICINE

## 2023-01-18 PROCEDURE — 3008F BODY MASS INDEX DOCD: CPT | Performed by: INTERNAL MEDICINE

## 2023-01-18 PROCEDURE — 99214 OFFICE O/P EST MOD 30 MIN: CPT | Performed by: INTERNAL MEDICINE

## 2023-01-18 PROCEDURE — 3078F DIAST BP <80 MM HG: CPT | Performed by: INTERNAL MEDICINE

## 2023-01-18 NOTE — TELEPHONE ENCOUNTER
Spoke to patient regarding on call page last night of \"extreme pelvic pain\". She reports that she had an episode of 10/10 pelvic cramping last night that woke her up out of her sleep. She considered going to the ER but then her pain lessened after 30-45 minutes and she was able to go to sleep. She has not been taking anything OTC for the pain. She went to an hour long exercise class this morning and then had 1 episode of diarrhea which seemed to further decrease her pain level. She mentions her bowel movements have changed since having the pelvic abscess. She was told her CT shows wall hardening in her small intestines. Consistency of stool seems regular but she is having smaller and more frequent episodes, doesn't feel she has the \"holding capacity\" she used to. She was on IV antibiotics for 2 weeks and expresses she found it a little strange she was not given oral antibiotics after by infectious disease. She denies any fevers, chills, night sweats, vaginal discharge, UTI symptoms, nausea, vomiting or trouble eating or drinking. Discussed that even though the pain has subsided it would be best for her to be evaluated today in case any additional imaging may be needed. Patient is agreeable to come in today at 11:30am with Dr. Mariusz Major. She agrees to call sooner if anything worsens in the meantime.

## 2023-01-23 ENCOUNTER — TELEPHONE (OUTPATIENT)
Dept: INTERNAL MEDICINE CLINIC | Facility: CLINIC | Age: 43
End: 2023-01-23

## 2023-01-23 NOTE — TELEPHONE ENCOUNTER
Patient calling with update. Has questions that she would like to speak to nurse or Dr. Alejandro Joy. No other information given.     Best call back number [phone]

## 2023-01-23 NOTE — TELEPHONE ENCOUNTER
To Dr Makenzie Chris  Please advise    Pt calling office back per your instructions to report on Miralax use. States tried 1/2 capful of Miralax with minimal results then switched to 1 full capful of Miralax with good results. But reports feeling tired and having sleep interruptions bc she feels bloated and has to have bm's in the middle of the night. Would like to know what regimen you want her to be on?

## 2023-02-14 ENCOUNTER — OFFICE VISIT (OUTPATIENT)
Dept: DERMATOLOGY CLINIC | Facility: CLINIC | Age: 43
End: 2023-02-14

## 2023-02-14 DIAGNOSIS — L73.8 SEBACEOUS HYPERPLASIA: ICD-10-CM

## 2023-02-14 DIAGNOSIS — B07.9 VERRUCA VULGARIS: Primary | ICD-10-CM

## 2023-02-14 PROCEDURE — 17110 DESTRUCTION B9 LES UP TO 14: CPT | Performed by: STUDENT IN AN ORGANIZED HEALTH CARE EDUCATION/TRAINING PROGRAM

## 2023-02-14 PROCEDURE — 99203 OFFICE O/P NEW LOW 30 MIN: CPT | Performed by: STUDENT IN AN ORGANIZED HEALTH CARE EDUCATION/TRAINING PROGRAM

## 2023-04-27 ENCOUNTER — OFFICE VISIT (OUTPATIENT)
Facility: CLINIC | Age: 43
End: 2023-04-27

## 2023-04-27 ENCOUNTER — TELEPHONE (OUTPATIENT)
Facility: CLINIC | Age: 43
End: 2023-04-27

## 2023-04-27 VITALS
DIASTOLIC BLOOD PRESSURE: 81 MMHG | BODY MASS INDEX: 26.68 KG/M2 | WEIGHT: 145 LBS | HEART RATE: 71 BPM | SYSTOLIC BLOOD PRESSURE: 117 MMHG | HEIGHT: 62 IN

## 2023-04-27 DIAGNOSIS — Z12.11 COLON CANCER SCREENING: ICD-10-CM

## 2023-04-27 DIAGNOSIS — Z12.11 SCREENING FOR COLON CANCER: Primary | ICD-10-CM

## 2023-04-27 DIAGNOSIS — K59.00 CONSTIPATION, UNSPECIFIED CONSTIPATION TYPE: Primary | ICD-10-CM

## 2023-04-27 PROCEDURE — 99204 OFFICE O/P NEW MOD 45 MIN: CPT | Performed by: INTERNAL MEDICINE

## 2023-04-27 PROCEDURE — 3008F BODY MASS INDEX DOCD: CPT | Performed by: INTERNAL MEDICINE

## 2023-04-27 PROCEDURE — 3074F SYST BP LT 130 MM HG: CPT | Performed by: INTERNAL MEDICINE

## 2023-04-27 PROCEDURE — 3079F DIAST BP 80-89 MM HG: CPT | Performed by: INTERNAL MEDICINE

## 2023-04-27 NOTE — PATIENT INSTRUCTIONS
Constipation  - high fiber diet and work on fluids  - Miralax as needed    Colonoscopy for colon cancer screening  - Golytely and MAC sedation

## 2023-04-27 NOTE — TELEPHONE ENCOUNTER
Scheduled for:  Colonoscopy [id number], [address] [facility]  Provider Name:    Date:  8/18/2023  Location:  [facility]  Sedation:  MAC  Time:  8:15 am, (pt is aware to arrive at 7:15 am)  Prep:  Golytely  Meds/Allergies Reconciled?:  Physician reviewed  Diagnosis with codes:  Screening for Colon Cancer Z12.11  Was patient informed to call insurance with codes (Y/N): Yes     Referral sent?:  Referral was sent at the time of electronic surgical scheduling. [address] [facility] or [address] [place] [address] notified?:   I sent an electronic request to Endo Scheduling and received a confirmation today. Medication Orders:  N/A  Misc Orders:  N/A     Further instructions given by staff: I discussed the prep instructions with the patient which she verbally understood. Provided patient with prep instructions at the time of office visit.

## 2023-05-31 ENCOUNTER — LAB ENCOUNTER (OUTPATIENT)
Dept: LAB | Age: 43
End: 2023-05-31
Attending: PODIATRIST
Payer: COMMERCIAL

## 2023-05-31 ENCOUNTER — OFFICE VISIT (OUTPATIENT)
Dept: INTERNAL MEDICINE CLINIC | Facility: CLINIC | Age: 43
End: 2023-05-31

## 2023-05-31 VITALS
HEART RATE: 73 BPM | HEIGHT: 62 IN | RESPIRATION RATE: 16 BRPM | BODY MASS INDEX: 26.65 KG/M2 | TEMPERATURE: 98 F | WEIGHT: 144.81 LBS | DIASTOLIC BLOOD PRESSURE: 68 MMHG | SYSTOLIC BLOOD PRESSURE: 108 MMHG | OXYGEN SATURATION: 98 %

## 2023-05-31 DIAGNOSIS — Z01.818 PREOPERATIVE EXAMINATION: ICD-10-CM

## 2023-05-31 DIAGNOSIS — Z00.00 ANNUAL PHYSICAL EXAM: Primary | ICD-10-CM

## 2023-05-31 DIAGNOSIS — M20.21 HALLUX RIGIDUS OF RIGHT FOOT: Primary | ICD-10-CM

## 2023-05-31 DIAGNOSIS — Z00.00 ANNUAL PHYSICAL EXAM: ICD-10-CM

## 2023-05-31 DIAGNOSIS — M77.51 TENDINITIS OF RIGHT ANKLE: ICD-10-CM

## 2023-05-31 LAB
ANION GAP SERPL CALC-SCNC: 5 MMOL/L (ref 0–18)
BASOPHILS # BLD AUTO: 0.03 X10(3) UL (ref 0–0.2)
BASOPHILS NFR BLD AUTO: 0.4 %
BUN BLD-MCNC: 11 MG/DL (ref 7–18)
BUN/CREAT SERPL: 16.4 (ref 10–20)
CALCIUM BLD-MCNC: 9 MG/DL (ref 8.5–10.1)
CHLORIDE SERPL-SCNC: 107 MMOL/L (ref 98–112)
CHOLEST SERPL-MCNC: 256 MG/DL (ref ?–200)
CO2 SERPL-SCNC: 25 MMOL/L (ref 21–32)
CREAT BLD-MCNC: 0.67 MG/DL
DEPRECATED RDW RBC AUTO: 41.5 FL (ref 35.1–46.3)
EOSINOPHIL # BLD AUTO: 0.13 X10(3) UL (ref 0–0.7)
EOSINOPHIL NFR BLD AUTO: 1.6 %
ERYTHROCYTE [DISTWIDTH] IN BLOOD BY AUTOMATED COUNT: 12 % (ref 11–15)
FASTING PATIENT LIPID ANSWER: YES
FASTING STATUS PATIENT QL REPORTED: YES
GFR SERPLBLD BASED ON 1.73 SQ M-ARVRAT: 110 ML/MIN/1.73M2 (ref 60–?)
GLUCOSE BLD-MCNC: 101 MG/DL (ref 70–99)
HCT VFR BLD AUTO: 39.8 %
HDLC SERPL-MCNC: 80 MG/DL (ref 40–59)
HGB BLD-MCNC: 13.6 G/DL
IMM GRANULOCYTES # BLD AUTO: 0.01 X10(3) UL (ref 0–1)
IMM GRANULOCYTES NFR BLD: 0.1 %
LDLC SERPL CALC-MCNC: 149 MG/DL (ref ?–100)
LYMPHOCYTES # BLD AUTO: 1.25 X10(3) UL (ref 1–4)
LYMPHOCYTES NFR BLD AUTO: 15.2 %
MCH RBC QN AUTO: 31.7 PG (ref 26–34)
MCHC RBC AUTO-ENTMCNC: 34.2 G/DL (ref 31–37)
MCV RBC AUTO: 92.8 FL
MONOCYTES # BLD AUTO: 0.39 X10(3) UL (ref 0.1–1)
MONOCYTES NFR BLD AUTO: 4.7 %
NEUTROPHILS # BLD AUTO: 6.44 X10 (3) UL (ref 1.5–7.7)
NEUTROPHILS # BLD AUTO: 6.44 X10(3) UL (ref 1.5–7.7)
NEUTROPHILS NFR BLD AUTO: 78 %
NONHDLC SERPL-MCNC: 176 MG/DL (ref ?–130)
OSMOLALITY SERPL CALC.SUM OF ELEC: 284 MOSM/KG (ref 275–295)
PLATELET # BLD AUTO: 270 10(3)UL (ref 150–450)
POTASSIUM SERPL-SCNC: 3.8 MMOL/L (ref 3.5–5.1)
RBC # BLD AUTO: 4.29 X10(6)UL
SODIUM SERPL-SCNC: 137 MMOL/L (ref 136–145)
TRIGL SERPL-MCNC: 155 MG/DL (ref 30–149)
TSI SER-ACNC: 1.45 MIU/ML (ref 0.36–3.74)
VLDLC SERPL CALC-MCNC: 29 MG/DL (ref 0–30)
WBC # BLD AUTO: 8.3 X10(3) UL (ref 4–11)

## 2023-05-31 PROCEDURE — 99214 OFFICE O/P EST MOD 30 MIN: CPT | Performed by: INTERNAL MEDICINE

## 2023-05-31 PROCEDURE — 3078F DIAST BP <80 MM HG: CPT | Performed by: INTERNAL MEDICINE

## 2023-05-31 PROCEDURE — 80061 LIPID PANEL: CPT

## 2023-05-31 PROCEDURE — 84443 ASSAY THYROID STIM HORMONE: CPT

## 2023-05-31 PROCEDURE — 80048 BASIC METABOLIC PNL TOTAL CA: CPT

## 2023-05-31 PROCEDURE — 85025 COMPLETE CBC W/AUTO DIFF WBC: CPT

## 2023-05-31 PROCEDURE — 3008F BODY MASS INDEX DOCD: CPT | Performed by: INTERNAL MEDICINE

## 2023-05-31 PROCEDURE — 36415 COLL VENOUS BLD VENIPUNCTURE: CPT

## 2023-05-31 PROCEDURE — 3074F SYST BP LT 130 MM HG: CPT | Performed by: INTERNAL MEDICINE

## 2023-05-31 RX ORDER — POLYETHYLENE GLYCOL-3350 AND ELECTROLYTES 236; 6.74; 5.86; 2.97; 22.74 G/274.31G; G/274.31G; G/274.31G; G/274.31G; G/274.31G
POWDER, FOR SOLUTION ORAL
COMMUNITY
Start: 2023-04-27

## 2023-06-05 ENCOUNTER — PATIENT MESSAGE (OUTPATIENT)
Dept: INTERNAL MEDICINE CLINIC | Facility: CLINIC | Age: 43
End: 2023-06-05

## 2023-06-05 NOTE — TELEPHONE ENCOUNTER
From: Redd Carvajal  Sent: 6/5/2023 3:06 PM CDT  To: Em  Nicole Clinical Staff  Subject: results    Yes, agreed. Already cutting out the cold foam lattes and doing [employer]. Doreen Coleman see. Thanks!

## 2023-06-12 ENCOUNTER — TELEPHONE (OUTPATIENT)
Dept: INTERNAL MEDICINE CLINIC | Facility: CLINIC | Age: 43
End: 2023-06-12

## 2023-06-13 RX ORDER — ZOLPIDEM TARTRATE 5 MG/1
5 TABLET ORAL NIGHTLY PRN
Qty: 30 TABLET | Refills: 5 | Status: SHIPPED | OUTPATIENT
Start: 2023-06-13

## 2023-06-13 NOTE — TELEPHONE ENCOUNTER
To MD:  The above refill request is for a controlled substance. Please review pended medication order. Print and sign for staff to fax to pharmacy or prescribe electronically.     Last office visit: 5/31/23  Last time refill sent and quantity/refills: 10/18/22 #30 with 5   Per [place] , last dispensed 3/20/23  RX expires after 6 months

## 2023-08-07 ENCOUNTER — TELEPHONE (OUTPATIENT)
Dept: INTERNAL MEDICINE CLINIC | Facility: CLINIC | Age: 43
End: 2023-08-07

## 2023-08-07 DIAGNOSIS — Z12.31 ENCOUNTER FOR SCREENING MAMMOGRAM FOR MALIGNANT NEOPLASM OF BREAST: Primary | ICD-10-CM

## 2023-08-18 ENCOUNTER — ANESTHESIA (OUTPATIENT)
Dept: ENDOSCOPY | Age: 43
End: 2023-08-18
Payer: COMMERCIAL

## 2023-08-18 ENCOUNTER — ANESTHESIA EVENT (OUTPATIENT)
Dept: ENDOSCOPY | Age: 43
End: 2023-08-18
Payer: COMMERCIAL

## 2023-08-18 ENCOUNTER — HOSPITAL ENCOUNTER (OUTPATIENT)
Age: 43
Setting detail: HOSPITAL OUTPATIENT SURGERY
Discharge: HOME | End: 2023-08-18
Attending: INTERNAL MEDICINE | Admitting: INTERNAL MEDICINE
Payer: COMMERCIAL

## 2023-08-18 VITALS
BODY MASS INDEX: 26.5 KG/M2 | WEIGHT: 144 LBS | OXYGEN SATURATION: 100 % | SYSTOLIC BLOOD PRESSURE: 107 MMHG | HEIGHT: 62 IN | RESPIRATION RATE: 19 BRPM | HEART RATE: 62 BPM | DIASTOLIC BLOOD PRESSURE: 60 MMHG

## 2023-08-18 DIAGNOSIS — Z12.11 SCREENING FOR COLON CANCER: ICD-10-CM

## 2023-08-18 PROCEDURE — 45385 COLONOSCOPY W/LESION REMOVAL: CPT | Performed by: INTERNAL MEDICINE

## 2023-08-18 PROCEDURE — 88305 TISSUE EXAM BY PATHOLOGIST: CPT | Performed by: INTERNAL MEDICINE

## 2023-08-18 RX ORDER — LIDOCAINE HYDROCHLORIDE 10 MG/ML
INJECTION, SOLUTION EPIDURAL; INFILTRATION; INTRACAUDAL; PERINEURAL AS NEEDED
Status: DISCONTINUED | OUTPATIENT
Start: 2023-08-18 | End: 2023-08-18 | Stop reason: SURG

## 2023-08-18 RX ORDER — SODIUM CHLORIDE, SODIUM LACTATE, POTASSIUM CHLORIDE, CALCIUM CHLORIDE 600; 310; 30; 20 MG/100ML; MG/100ML; MG/100ML; MG/100ML
INJECTION, SOLUTION INTRAVENOUS CONTINUOUS
Status: DISCONTINUED | OUTPATIENT
Start: 2023-08-18 | End: 2023-08-18

## 2023-08-18 RX ADMIN — LIDOCAINE HYDROCHLORIDE 50 MG: 10 INJECTION, SOLUTION EPIDURAL; INFILTRATION; INTRACAUDAL; PERINEURAL at 08:04:00

## 2023-08-18 RX ADMIN — SODIUM CHLORIDE, SODIUM LACTATE, POTASSIUM CHLORIDE, CALCIUM CHLORIDE: 600; 310; 30; 20 INJECTION, SOLUTION INTRAVENOUS at 08:04:00

## 2023-08-18 NOTE — DISCHARGE INSTRUCTIONS

## 2023-08-18 NOTE — ANESTHESIA POSTPROCEDURE EVALUATION
Patient: Gabby Minor    Procedure Summary       Date: 08/18/23 Room / Location: [facility] ENDOSCOPY 01 / [facility] ENDO    Anesthesia Start: [address] Anesthesia Stop: 8123    Procedure: COLONOSCOPY Diagnosis:       Screening for colon cancer      (polyp, hemorroids)    Surgeons: Jaiden Alvarado MD Anesthesiologist: Rosalie Benavides MD    Anesthesia Type: MAC ASA Status: 1            Anesthesia Type: MAC    Vitals Value Taken Time   /80 08/18/23 0832   Temp  08/18/23 0833   Pulse 78 08/18/23 0832   Resp 13 08/18/23 0832   SpO2 100 % 08/18/23 0832   Vitals shown include unvalidated device data.     [address] [facility] AN Post Evaluation:   Patient Evaluated in PACU  Patient Participation: complete - patient participated  Level of Consciousness: awake  Pain Management: adequate  Airway Patency:patent  Yes    Cardiovascular Status: acceptable  Respiratory Status: acceptable  Postoperative Hydration acceptable      Italia Wyman MD  8/18/2023 8:33 AM

## 2023-08-18 NOTE — H&P
History & Physical Examination    Patient Name: Gwen Sandoval  MRN: [id number]  CSN: [id number]  YOB: 1980    Diagnosis:     Colon cancer screening  Abdominal pain  Change in bowel habits      zolpidem 5 MG Oral Tab, Take 1 tablet (5 mg total) by mouth nightly as needed for Sleep., Disp: 30 tablet, Rfl: 5  albuterol (PROAIR HFA) 108 (90 Base) MCG/ACT Inhalation Aero Soln, Inhale 2 puffs into the lungs every 6 (six) hours as needed for Wheezing., Disp: 2 each, Rfl: 3, PRN  sertraline 25 MG Oral Tab, Take 1 tablet (25 mg total) by mouth daily. , Disp: 90 tablet, Rfl: 3, PRN  Levocetirizine Dihydrochloride (XYZAL) 5 MG Oral Tab, Take 1 tablet (5 mg total) by mouth nightly., Disp: 90 tablet, Rfl: 1, PRN  Fluticasone Propionate (FLONASE) 50 MCG/ACT Nasal Suspension, 1 spray by Nasal route daily. , Disp: 1 Bottle, Rfl: 5, PRN  GAVILYTE-G 236 g Oral Recon Soln, TAKE AS DIRECTED BY GI CLINIC, Disp: , Rfl:   tretinoin 0.025 % External Cream, Apply pea sized amount to the full face at night, making sure to avoid the eyes and lips. Wash off in the morning. Start using every other night and then progress to every night as tolerated.  Apply moisturizer nightly to avoid excessive drying, Disp: 45 g, Rfl: 0      lactated ringers infusion, , Intravenous, Continuous        Allergies:   Lactose                     Comment:GI distress  Tree, Elm               Runny nose  Latex                   HIVES    Past Medical History:   Diagnosis Date    Allergic rhinitis     Allergy     Per NG: Decensitization    Anesthesia complication     Anxiety     Asthma     allergy induced    Back problem     Depression     Deviated nasal septum 04/21/2011    Per NG: nasal septoplasty    Eczema     Extrinsic asthma, unspecified     High cholesterol     History of ethmoidectomy 04/21/2011    History of surgery 04/21/2011    Per NG: maxillary antrostomy    Hx of motion sickness     Hyperlipidemia     PONV (postoperative nausea and vomiting) Visual impairment     contacts/glasses     Past Surgical History:   Procedure Laterality Date    BACK SURGERY  2009    BACK SURGERY  2018    L5-S1 Revision Decomp Fusion     COLONOSCOPY N/A 2016    Procedure: COLONOSCOPY;  Surgeon: Maria Del Carmen Acosta MD;  Location: [facility] ENDOSCOPY    CORRECT BUNION,OTHR METHODS      bilateral    D & C  2005    HYSTERECTOMY  20    LEG/ANKLE SURGERY PROC UNLISTED Right 2015    Ligament repair.   2007    10/09/2001    SINUS SURGERY        septoplasty 2009     Family History   Problem Relation Age of Onset    Cancer Father         Non-small cell lung cancer    Lipids Mother 54    No Known Problems Daughter     No Known Problems Son     No Known Problems Son     Dementia Maternal Grandmother 79    Cancer Maternal Grandfather         Uterine cancer    Cancer Paternal Grandmother         uterine    Stroke Paternal Grandfather 48        smoker    Diabetes Paternal Grandfather     Hypertension Brother     Asthma Brother     Allergies Brother     Cancer Paternal Aunt 48        uterine     Social History    Tobacco Use      Smoking status: Never      Smokeless tobacco: Never    Alcohol use: Yes      Alcohol/week: 2.0 standard drinks of alcohol      Types: 1 Glasses of wine, 1 Cans of beer per week      Comment: occassionally 1-2 hard seltzer/week      SYSTEM Check if Review is Normal Check if Physical Exam is Normal If not normal, please explain:   HEENT [x ] [ x]    NECK & BACK [x ] [x ]    HEART [x ] [ x]    LUNGS [x ] [ x]    ABDOMEN [x ] [x ]    UROGENITAL [ ] [ ]    EXTREMITIES [x ] [x ]    OTHER        [ x ] I have discussed the risks and benefits and alternatives with the patient/family. They understand and agree to proceed with plan of care. [ x ] I have reviewed the History and Physical done within the last 30 days. Any changes noted above. Jairon Fontenot.  Alejandro Galicia MD  2023  7:56 AM

## 2023-08-18 NOTE — OPERATIVE REPORT
[facility] Endoscopy Report      Preoperative Diagnosis:  - colon cancer screening  - abdominal pain  - change in bowel habits      Postoperative Diagnosis:  - colon polyp x1  - small internal hemorrhoids      Procedure:    Colonoscopy       Surgeon:  Ashlee Stevens M.D. Anesthesia:  MAC     Technique:  After informed consent, the patient was placed in the left lateral recumbent position. Digital rectal examination revealed no palpable intraluminal abnormalities. An Olympus variable stiffness 190 series HD colonoscope was inserted into the rectum and advanced under direct vision by following the lumen to the cecum. The colon was examined upon withdrawal in the left lateral position. The procedure was well tolerated without immediate complication. Findings:  The preparation of the colon was good. The terminal ileum was examined for 4 cm and visually normal.  The ileocecal valve was well preserved. The visualized colonic mucosa from the cecum to the anal verge was normal with an intact vascular pattern. Sigmoid colon polyp x1, this was sessile 4 mm in size and cold snare removed. No bleeding was noted from polypectomy site and specimen retrieved and sent for analysis. Small internal hemorrhoids noted on retroflexed view. Estimated blood loss-insignificant  Specimens-colon polyp      Impression:  - colon polyp x1  - small internal hemorrhoids    Recommendations:  - Post polypectomy instructions given  - Repeat colonoscopy in 5- 10 years  - Symptomatic treatment of hemorrhoids          Everardo Carson.  Rolanda Bertrand MD  8/18/2023  8:32 AM

## 2023-08-22 ENCOUNTER — TELEPHONE (OUTPATIENT)
Dept: GASTROENTEROLOGY | Facility: CLINIC | Age: 43
End: 2023-08-22

## 2023-08-22 NOTE — TELEPHONE ENCOUNTER
Seen by patient Tiffanie Pedersen on 8/21/2023  2:10 PM     Health maintenance updated. 7 year colonoscopy recall placed in patient outreach. Next due on 08/18/2030 per Dr. Rashawn Bonilla.

## 2023-08-22 NOTE — TELEPHONE ENCOUNTER
----- Message from Sharmila Hart MD sent at 8/21/2023 10:12 AM CDT -----  I wanted to get back to you with your colonoscopy results. You had one colon polyp removed which was benign. I would advise a repeat colonoscopy in 7 years to make sure no new polyps are forming. You also have internal hemorrhoids. No cause noted for your abdominal pain and constipation. Please stay on a high fiber diet and follow up in the office with any ongoing issues.

## 2023-08-24 ENCOUNTER — MED REC SCAN ONLY (OUTPATIENT)
Dept: GASTROENTEROLOGY | Facility: CLINIC | Age: 43
End: 2023-08-24

## 2023-09-20 ENCOUNTER — TELEPHONE (OUTPATIENT)
Dept: INTERNAL MEDICINE CLINIC | Facility: CLINIC | Age: 43
End: 2023-09-20

## 2023-09-20 NOTE — TELEPHONE ENCOUNTER
Pt had labs done last Friday/Sept 15 at   [facility] - has Dr Rosalind Chou received fax with the results ?

## 2023-10-12 ENCOUNTER — HOSPITAL ENCOUNTER (OUTPATIENT)
Dept: MAMMOGRAPHY | Facility: HOSPITAL | Age: 43
Discharge: HOME | End: 2023-10-12
Attending: INTERNAL MEDICINE
Payer: COMMERCIAL

## 2023-10-12 DIAGNOSIS — Z12.31 ENCOUNTER FOR SCREENING MAMMOGRAM FOR MALIGNANT NEOPLASM OF BREAST: ICD-10-CM

## 2023-10-12 PROCEDURE — 77067 SCR MAMMO BI INCL CAD: CPT | Performed by: INTERNAL MEDICINE

## 2023-10-12 PROCEDURE — 77063 BREAST TOMOSYNTHESIS BI: CPT | Performed by: INTERNAL MEDICINE

## 2023-10-23 ENCOUNTER — TELEPHONE (OUTPATIENT)
Dept: INTERNAL MEDICINE CLINIC | Facility: CLINIC | Age: 43
End: 2023-10-23

## 2023-10-25 NOTE — TELEPHONE ENCOUNTER
Called patient indicated not in need of appointment due to recent treatment of knee no longer having surgery. Waiting a few months to see if symptoms improve. Will call for Physical in future.

## 2023-10-27 ENCOUNTER — TELEPHONE (OUTPATIENT)
Dept: INTERNAL MEDICINE CLINIC | Facility: CLINIC | Age: 43
End: 2023-10-27

## 2023-12-24 NOTE — PROGRESS NOTES
Jordana Perkins is a 34year old female. Patient presents with:  Physical: Needs a form completed for work. Feels well.        HPI:   Jordana Perkins is a 34year old female who presents for her annual physical exam    In terms of past medical history, she has a Outpatient Medications:  Phentermine HCl 37.5 MG Oral Tab Take 37.5 mg by mouth once daily. Disp:  Rfl: 1   Sertraline HCl 25 MG Oral Tab Take 1 tablet (25 mg total) by mouth daily.  Disp: 30 tablet Rfl: 3   Levocetirizine Dihydrochloride (XYZAL) 5 MG Oral nonsmoker, asbestos exposrure?    • Stroke Paternal Grandfather 48        smoker   • Cancer Paternal Grandmother         uterine   • Cancer Paternal Aunt 48        uterine   • Dementia Maternal Grandmother 79   • Cancer Maternal Grandfather         endometr do blood work with her new job;  Given mammogram order     2. Dyslipidemia  Repeat lipid panel    3. Anxiety  zoloft 25mg daily. 4. Insomnia  May be hormonally mediated; given rx for harika kingn.      Bradley Benavides, DO ED Record Reviewed

## 2024-03-13 ENCOUNTER — TELEPHONE (OUTPATIENT)
Dept: INTERNAL MEDICINE CLINIC | Facility: CLINIC | Age: 44
End: 2024-03-13

## 2024-03-13 DIAGNOSIS — G47.00 INSOMNIA, UNSPECIFIED TYPE: Primary | ICD-10-CM

## 2024-03-13 RX ORDER — ZOLPIDEM TARTRATE 5 MG/1
5 TABLET ORAL NIGHTLY PRN
Qty: 30 TABLET | Refills: 5 | Status: CANCELLED | OUTPATIENT
Start: 2024-03-13

## 2024-03-13 NOTE — TELEPHONE ENCOUNTER
To Dr. JACKSON-      The above refill request is for a controlled substance.  Please review pended medication order.  LOV: 5/31  Prescribed: 30 with 5 6/13  : 12/7 30    To FD-    Please call pt to schedule annual. Will be due in May. Thanks!

## 2024-03-13 NOTE — TELEPHONE ENCOUNTER
Current refill request refused due to refill is either a duplicate request or has active refills at the pharmacy.  Check previous templates.    Requested Prescriptions     Pending Prescriptions Disp Refills    zolpidem 5 MG Oral Tab 30 tablet 5     Sig: Take 1 tablet (5 mg total) by mouth nightly as needed for Sleep.

## 2024-03-14 RX ORDER — ZOLPIDEM TARTRATE 5 MG/1
5 TABLET ORAL NIGHTLY PRN
Qty: 30 TABLET | Refills: 1 | Status: SHIPPED | OUTPATIENT
Start: 2024-03-14

## 2024-05-13 PROBLEM — R58 POSTOPERATIVE ECCHYMOSIS: Status: RESOLVED | Noted: 2022-11-28 | Resolved: 2024-05-13

## 2024-05-13 PROBLEM — K65.1 POSTOPERATIVE INTRA-ABDOMINAL ABSCESS (HCC): Status: RESOLVED | Noted: 2022-12-02 | Resolved: 2024-05-13

## 2024-05-13 PROBLEM — N73.9 PELVIC ABSCESS IN FEMALE: Status: RESOLVED | Noted: 2022-12-19 | Resolved: 2024-05-13

## 2024-05-13 PROBLEM — D72.829 LEUKOCYTOSIS, UNSPECIFIED TYPE: Status: RESOLVED | Noted: 2022-12-02 | Resolved: 2024-05-13

## 2024-05-13 PROBLEM — G89.18 POST-OPERATIVE PAIN: Status: RESOLVED | Noted: 2022-12-02 | Resolved: 2024-05-13

## 2024-05-13 PROBLEM — T81.43XA POSTOPERATIVE INTRA-ABDOMINAL ABSCESS (HCC): Status: RESOLVED | Noted: 2022-12-02 | Resolved: 2024-05-13

## 2024-08-27 ENCOUNTER — OFFICE VISIT (OUTPATIENT)
Dept: OBGYN CLINIC | Facility: CLINIC | Age: 44
End: 2024-08-27

## 2024-08-27 VITALS
BODY MASS INDEX: 24 KG/M2 | SYSTOLIC BLOOD PRESSURE: 108 MMHG | WEIGHT: 127 LBS | HEART RATE: 83 BPM | DIASTOLIC BLOOD PRESSURE: 73 MMHG

## 2024-08-27 DIAGNOSIS — Z01.419 ENCOUNTER FOR GYNECOLOGICAL EXAMINATION WITHOUT ABNORMAL FINDING: Primary | ICD-10-CM

## 2024-08-27 DIAGNOSIS — Z12.31 SCREENING MAMMOGRAM FOR BREAST CANCER: ICD-10-CM

## 2024-08-27 PROCEDURE — 3074F SYST BP LT 130 MM HG: CPT | Performed by: OBSTETRICS & GYNECOLOGY

## 2024-08-27 PROCEDURE — 3078F DIAST BP <80 MM HG: CPT | Performed by: OBSTETRICS & GYNECOLOGY

## 2024-08-27 PROCEDURE — 99396 PREV VISIT EST AGE 40-64: CPT | Performed by: OBSTETRICS & GYNECOLOGY

## 2024-09-03 NOTE — PROGRESS NOTES
Subjective:   Patient ID: Virginie Dacosta is a 43 year old female.    HPI   with amenorrhea post hysterectomy for benign disease. ROS significant for OTIS using a liner daily. Dr Mills already placed referral for PFPT. She does [employer] for exercise.     History/Other:   Review of Systems   Constitutional:  Negative for appetite change, fatigue and unexpected weight change.   Eyes:  Negative for visual disturbance.   Respiratory:  Negative for cough and shortness of breath.    Cardiovascular:  Negative for chest pain, palpitations and leg swelling.   Gastrointestinal:  Negative for abdominal distention, abdominal pain, blood in stool, constipation and diarrhea.   Genitourinary:  Negative for dyspareunia, dysuria, frequency, pelvic pain and urgency.   Musculoskeletal:  Negative for arthralgias and myalgias.   Skin:  Negative for rash.   Neurological:  Positive for numbness (LLE post fusion). Negative for weakness and headaches.   Psychiatric/Behavioral:  Negative for dysphoric mood. The patient is not nervous/anxious.      Current Outpatient Medications   Medication Sig Dispense Refill    Tirzepatide (MOUNJARO SC) Inject into the skin. Administered [facility]      sertraline 25 MG Oral Tab Take 1 tablet (25 mg total) by mouth daily. 90 tablet 3    zolpidem 5 MG Oral Tab Take 1 tablet (5 mg total) by mouth nightly as needed for Sleep. 30 tablet 1    tretinoin 0.025 % External Cream Apply pea sized amount to the full face at night, making sure to avoid the eyes and lips. Wash off in the morning. Start using every other night and then progress to every night as tolerated. Apply moisturizer nightly to avoid excessive drying 45 g 0    albuterol (PROAIR HFA) 108 (90 Base) MCG/ACT Inhalation Aero Soln Inhale 2 puffs into the lungs every 6 (six) hours as needed for Wheezing. 2 each 3    Levocetirizine Dihydrochloride (XYZAL) 5 MG Oral Tab Take 1 tablet (5 mg total) by mouth nightly. 90 tablet 1     Fluticasone Propionate (FLONASE) 50 MCG/ACT Nasal Suspension 1 spray by Nasal route daily. 1 Bottle 5     Allergies:  Allergies   Allergen Reactions    Lactose      GI distress    Tree, Elm Runny nose    Latex HIVES       Objective:   Physical Exam  Constitutional:       General: She is not in acute distress.     Appearance: She is well-developed. She is not diaphoretic.   Neck:      Thyroid: No thyromegaly.   Cardiovascular:      Rate and Rhythm: Normal rate and regular rhythm.      Heart sounds: Normal heart sounds. No murmur heard.  Pulmonary:      Effort: Pulmonary effort is normal.      Breath sounds: Normal breath sounds. No wheezing or rales.   Chest:   Breasts:     Right: Normal. No mass, nipple discharge, skin change or tenderness.      Left: Normal. No mass, nipple discharge, skin change or tenderness.      Comments:     Abdominal:      General: There is no distension.      Palpations: Abdomen is soft. There is no mass.      Tenderness: There is no abdominal tenderness. There is no guarding or rebound.   Genitourinary:     Labia:         Right: No rash or lesion.         Left: No rash or lesion.       Vagina: Normal. No vaginal discharge.      Uterus: Absent.       Adnexa:         Right: No mass, tenderness or fullness.          Left: No mass, tenderness or fullness.     Musculoskeletal:         General: No tenderness.      Cervical back: Neck supple.   Lymphadenopathy:      Cervical: No cervical adenopathy.      Upper Body:      Right upper body: No supraclavicular, axillary or pectoral adenopathy.      Left upper body: No supraclavicular, axillary or pectoral adenopathy.   Neurological:      Mental Status: She is alert.         Assessment & Plan:   1. Encounter for gynecological examination without abnormal finding    2. Screening mammogram for breast cancer        No orders of the defined types were placed in this encounter.      Meds This Visit:  Requested Prescriptions      No prescriptions requested  or ordered in this encounter       Imaging & Referrals:  [facility] SILKE 2D+3D SCREENING BILAT (CPT=77067/[id number])

## 2024-10-03 ENCOUNTER — TELEPHONE (OUTPATIENT)
Dept: INTERNAL MEDICINE CLINIC | Facility: CLINIC | Age: 44
End: 2024-10-03

## 2024-10-03 DIAGNOSIS — G47.00 INSOMNIA, UNSPECIFIED TYPE: ICD-10-CM

## 2024-10-03 NOTE — TELEPHONE ENCOUNTER
To MD:  The above refill request is for a controlled substance.  Please review pended medication order.   Print and sign for staff to fax to pharmacy or prescribe electronically.    Last office visit: 5/13/2024  Last time refill sent and quantity/refills: 5/13/2024 #30 with 1 refill

## 2024-10-04 RX ORDER — ZOLPIDEM TARTRATE 5 MG/1
TABLET ORAL
Qty: 30 TABLET | Refills: 1 | Status: SHIPPED | OUTPATIENT
Start: 2024-10-04

## 2024-11-07 ENCOUNTER — HOSPITAL ENCOUNTER (OUTPATIENT)
Dept: MAMMOGRAPHY | Facility: HOSPITAL | Age: 44
Discharge: HOME | End: 2024-11-07
Attending: OBSTETRICS & GYNECOLOGY
Payer: COMMERCIAL

## 2024-11-07 DIAGNOSIS — Z12.31 SCREENING MAMMOGRAM FOR BREAST CANCER: ICD-10-CM

## 2024-11-07 PROCEDURE — 77067 SCR MAMMO BI INCL CAD: CPT | Performed by: OBSTETRICS & GYNECOLOGY

## 2024-11-07 PROCEDURE — 77063 BREAST TOMOSYNTHESIS BI: CPT | Performed by: OBSTETRICS & GYNECOLOGY

## 2024-12-17 ENCOUNTER — PATIENT MESSAGE (OUTPATIENT)
Dept: INTERNAL MEDICINE CLINIC | Facility: CLINIC | Age: 44
End: 2024-12-17

## 2024-12-17 DIAGNOSIS — Z00.00 ANNUAL PHYSICAL EXAM: Primary | ICD-10-CM

## 2024-12-19 ENCOUNTER — LAB ENCOUNTER (OUTPATIENT)
Dept: LAB | Age: 44
End: 2024-12-19
Attending: INTERNAL MEDICINE
Payer: COMMERCIAL

## 2024-12-19 DIAGNOSIS — Z00.00 ANNUAL PHYSICAL EXAM: ICD-10-CM

## 2024-12-19 LAB
ALBUMIN SERPL-MCNC: 4.3 G/DL (ref 3.2–4.8)
ALBUMIN/GLOB SERPL: 2 {RATIO} (ref 1–2)
ALP LIVER SERPL-CCNC: 58 U/L
ALT SERPL-CCNC: 14 U/L
ANION GAP SERPL CALC-SCNC: 6 MMOL/L (ref 0–18)
AST SERPL-CCNC: 19 U/L (ref ?–34)
BASOPHILS # BLD AUTO: 0.03 X10(3) UL (ref 0–0.2)
BASOPHILS NFR BLD AUTO: 0.5 %
BILIRUB SERPL-MCNC: 0.6 MG/DL (ref 0.3–1.2)
BUN BLD-MCNC: 9 MG/DL (ref 9–23)
BUN/CREAT SERPL: 12.2 (ref 10–20)
CALCIUM BLD-MCNC: 9.3 MG/DL (ref 8.7–10.4)
CHLORIDE SERPL-SCNC: 106 MMOL/L (ref 98–112)
CHOLEST SERPL-MCNC: 227 MG/DL (ref ?–200)
CO2 SERPL-SCNC: 27 MMOL/L (ref 21–32)
CREAT BLD-MCNC: 0.74 MG/DL
DEPRECATED RDW RBC AUTO: 44.2 FL (ref 35.1–46.3)
EGFRCR SERPLBLD CKD-EPI 2021: 101 ML/MIN/1.73M2 (ref 60–?)
EOSINOPHIL # BLD AUTO: 0.09 X10(3) UL (ref 0–0.7)
EOSINOPHIL NFR BLD AUTO: 1.6 %
ERYTHROCYTE [DISTWIDTH] IN BLOOD BY AUTOMATED COUNT: 12.8 % (ref 11–15)
EST. AVERAGE GLUCOSE BLD GHB EST-MCNC: 94 MG/DL (ref 68–126)
FASTING PATIENT LIPID ANSWER: YES
FASTING STATUS PATIENT QL REPORTED: YES
GLOBULIN PLAS-MCNC: 2.2 G/DL (ref 2–3.5)
GLUCOSE BLD-MCNC: 83 MG/DL (ref 70–99)
HBA1C MFR BLD: 4.9 % (ref ?–5.7)
HCT VFR BLD AUTO: 39.6 %
HDLC SERPL-MCNC: 82 MG/DL (ref 40–59)
HGB BLD-MCNC: 13.3 G/DL
IMM GRANULOCYTES # BLD AUTO: 0.02 X10(3) UL (ref 0–1)
IMM GRANULOCYTES NFR BLD: 0.4 %
LDLC SERPL CALC-MCNC: 133 MG/DL (ref ?–100)
LYMPHOCYTES # BLD AUTO: 1.23 X10(3) UL (ref 1–4)
LYMPHOCYTES NFR BLD AUTO: 22.4 %
MCH RBC QN AUTO: 31.6 PG (ref 26–34)
MCHC RBC AUTO-ENTMCNC: 33.6 G/DL (ref 31–37)
MCV RBC AUTO: 94.1 FL
MONOCYTES # BLD AUTO: 0.42 X10(3) UL (ref 0.1–1)
MONOCYTES NFR BLD AUTO: 7.7 %
NEUTROPHILS # BLD AUTO: 3.69 X10 (3) UL (ref 1.5–7.7)
NEUTROPHILS # BLD AUTO: 3.69 X10(3) UL (ref 1.5–7.7)
NEUTROPHILS NFR BLD AUTO: 67.4 %
NONHDLC SERPL-MCNC: 145 MG/DL (ref ?–130)
OSMOLALITY SERPL CALC.SUM OF ELEC: 286 MOSM/KG (ref 275–295)
PLATELET # BLD AUTO: 258 10(3)UL (ref 150–450)
POTASSIUM SERPL-SCNC: 3.9 MMOL/L (ref 3.5–5.1)
PROT SERPL-MCNC: 6.5 G/DL (ref 5.7–8.2)
RBC # BLD AUTO: 4.21 X10(6)UL
SODIUM SERPL-SCNC: 139 MMOL/L (ref 136–145)
TRIGL SERPL-MCNC: 71 MG/DL (ref 30–149)
TSI SER-ACNC: 1.43 UIU/ML (ref 0.55–4.78)
VLDLC SERPL CALC-MCNC: 13 MG/DL (ref 0–30)
WBC # BLD AUTO: 5.5 X10(3) UL (ref 4–11)

## 2024-12-19 PROCEDURE — 36415 COLL VENOUS BLD VENIPUNCTURE: CPT

## 2024-12-19 PROCEDURE — 80061 LIPID PANEL: CPT

## 2024-12-19 PROCEDURE — 84443 ASSAY THYROID STIM HORMONE: CPT

## 2024-12-19 PROCEDURE — 80053 COMPREHEN METABOLIC PANEL: CPT

## 2024-12-19 PROCEDURE — 85025 COMPLETE CBC W/AUTO DIFF WBC: CPT

## 2024-12-19 PROCEDURE — 83036 HEMOGLOBIN GLYCOSYLATED A1C: CPT

## 2025-02-27 ENCOUNTER — TELEPHONE (OUTPATIENT)
Dept: INTERNAL MEDICINE CLINIC | Facility: CLINIC | Age: 45
End: 2025-02-27

## 2025-02-27 DIAGNOSIS — G47.00 INSOMNIA, UNSPECIFIED TYPE: ICD-10-CM

## 2025-02-28 RX ORDER — ZOLPIDEM TARTRATE 5 MG/1
TABLET ORAL
Qty: 30 TABLET | Refills: 1 | Status: SHIPPED | OUTPATIENT
Start: 2025-02-28

## 2025-02-28 NOTE — TELEPHONE ENCOUNTER
Rx sent   Detail Level: Detailed Quality 402: Tobacco Use And Help With Quitting Among Adolescents: Patient screened for tobacco and never smoked Quality 110: Preventive Care And Screening: Influenza Immunization: Influenza Immunization Administered during Influenza season

## 2025-02-28 NOTE — TELEPHONE ENCOUNTER
To MD:  The above refill request is for a controlled substance.  Please review pended medication order.   Print and sign for staff to fax to pharmacy or prescribe electronically.    Last office visit: 5/13/2024  Last time refill sent and quantity/refills: 10/4/2024 #30 with 1 refill

## 2025-05-02 ENCOUNTER — OFFICE VISIT (OUTPATIENT)
Age: 45
End: 2025-05-02
Payer: COMMERCIAL

## 2025-05-02 ENCOUNTER — HOSPITAL ENCOUNTER (OUTPATIENT)
Dept: GENERAL RADIOLOGY | Facility: HOSPITAL | Age: 45
Discharge: HOME | End: 2025-05-02
Attending: PHYSICIAN ASSISTANT
Payer: COMMERCIAL

## 2025-05-02 DIAGNOSIS — R52 PAIN: ICD-10-CM

## 2025-05-02 DIAGNOSIS — R52 PAIN: Primary | ICD-10-CM

## 2025-05-02 PROCEDURE — 72110 X-RAY EXAM L-2 SPINE 4/>VWS: CPT | Performed by: PHYSICIAN ASSISTANT

## 2025-05-02 PROCEDURE — 99213 OFFICE O/P EST LOW 20 MIN: CPT | Performed by: PHYSICIAN ASSISTANT

## 2025-05-02 RX ORDER — METHOCARBAMOL 500 MG/1
1 TABLET, FILM COATED ORAL 3 TIMES DAILY
COMMUNITY
Start: 2024-08-30

## 2025-05-02 NOTE — PROGRESS NOTES
Patient: Virginie Dacosta  Medical Record Number: [id number]  Site: [facility]  Referring Physician:  No ref. provider found  PCP: Ina Barraza DO        HISTORY OF CHIEF COMPLAINT:    Virginie Dacotsa is a 44 year old female, who complains of one week h/o right sided radiating LBP.  Denies saddle anesthesia or incontinence.  Patient has 1 week of low back pain that shoots to the right buttock and into the right anterior thigh.  The pain stops at her knee.  She has no numbness and tingling.  No weakness.  She states that she is already feeling much better.  The pain is gone from the leg.  She has still some low back pain and right buttock pain.  She has a history of an L5-S1 fusion by Dr. Arguello many years ago.  Last year she had a flareup of some leg pain and had a injection by Dr. Zamarripa and did great afterwards.    VAS Pain Score: 4 /10        Past Medical History[1]   Past Surgical History[2]   Family History[3]   Social Hx on file[4]   Current Medications:  Current Medications[5]     Work History:  The patient currently works full time.        IMAGING STUDIES:  X-rays were reviewed with the patient today  X-rays  Images were reviewed and discussed with the patient.  They voiced understanding of what the images showed.  EXAM: X-RAY OF LUMBAR SPINE     CLINICAL INFORMATION: Back pain     FINDINGS:     AP, Lateral with flexion/extension views of lumbar spine completed.   5 lumbar vertebral bodies seen.   Spondylosis is seen with disc space loss and sclerotic endplate changes and facet joint hypertrophy noted at L4-5  L5-S1 interbody fusion seen     No spondylolysis   L4-5 spondylolisthesis.  No fractures   No destructive lesions seen.      IMPRESSION:    Lumbar spondylosis as detailed above          PHYSICAL EXAMIMATION   PHYSICAL EXAMINATION: Virginie Dacosta is a 44 year old   female who is observed sitting comfortably in the exam room alert and oriented times three. RESPIRATORY RATE: 18 She looks consistent  her stated age.    LMP 10/20/2022 (Exact Date)   The patient is well developed, well nourished, thin body habitus, well muscled.       Inspection: No acute distress.   Patient displays antalgic gait, and is able to normal heel walk, normal toe walk.     Coordination: Well coordinated, Fluid gait      ROM:  Forward Flexion  Pain     Extension  Pain     Palpation:  See chart below:  Palpation of Lumbar Area Right   (POS or NEG) Left   (POS or NEG)   Lumbar paraspinals Neg Neg   SIJ Neg Neg   PSIS Neg Neg   Trochanteric Bursa Neg Neg     Strength:      DTR's:     Left Right    Left Right    EHL 5/5 5/5  Patella  2+/4 2+/4    DF 5/5 5/5  Achilles  2+/4 2+/4    PF 5/5 5/5    Quad 5/5 5/5    IP 5/5 5/5    Sensation:  Sensory deficits noted on bilateral lower extremities to light touch: none    Tests:  Test Right   (POS or NEG) Left   (POS or NEG)   SLR Neg Neg   Clonus Neg Neg      Calves supple, NT   MUSCULOSKELETAL: Fluid, pain-free ROM to bilateral: ankles, knees  & hips                                                                                     MEDICAL DECISION MAKING:   Impression: Lumbar Spine:  Spondylolisthesis Degenerative 738.4     Plan: We discussed the diagnosis and treatment options today, including rationale for surgical vs non-surgical options.  The patient has an L4-5 spondylolisthesis that we have known about.  We reviewed her Xrays today.  Last year she required an injection to feel better.  This flareup seems to be healing on its own.  I would hold off on an injection, or oral steroids at this point.  She will take regular anti-inflammatories.  She will continue her home exercise program.  Her concerns were addressed.  Follow-up as needed.  Restrictions and limitations were reviewed with the patient.  Concerns were addressed.  Questions were encouraged and answered.  Patient voiced understanding.  Images were reviewed and discussed with the patient.  They voiced understanding of what the images  showed.      The patient indicates understanding of these issues and agrees to the plan.    Thank you very much.     Respectfully yours,    Fortunato Randolph PA-C    This note was dictated using Dragon software. While it was briefly proofread prior to completion, some grammatical, spelling, and word choice errors due to dictation may still occur.       [1]   Past Medical History:   Allergic rhinitis    Allergy    Per NG: Decensitization    Anesthesia complication    Anxiety    Asthma (HCC)    allergy induced    Back problem    Depression    Deviated nasal septum    Per NG: nasal septoplasty    Eczema    Extrinsic asthma, unspecified    High cholesterol    History of ethmoidectomy    History of surgery    Per NG: maxillary antrostomy    Hx of motion sickness    Hyperlipidemia    PONV (postoperative nausea and vomiting)    Visual impairment    contacts/glasses   [2]   Past Surgical History:  Procedure Laterality Date    Back surgery      Back surgery  2018    L5-S1 Revision Decomp Fusion     Colonoscopy N/A 2016    Procedure: COLONOSCOPY;  Surgeon: Kenyetta Mcghee MD;  [facility]: [facility] ENDOSCOPY    Colonoscopy N/A 2023    Procedure: COLONOSCOPY;  Surgeon: Gabriel Torrez MD;  Location: [facility] ENDO    Correct bunion,othr methods      bilateral    D & c  2005    Hysterectomy  20    Leg/ankle surgery proc unlisted Right 2015    Ligament repair.       2007    10/09/2001    Sinus surgery        septoplasty    [3]   Family History  Problem Relation Age of Onset    Cancer Father         Non-small cell lung cancer    Lipids Mother 55    No Known Problems Daughter     No Known Problems Son     No Known Problems Son     Dementia Maternal Grandmother 70    Cancer Maternal Grandfather         Uterine cancer    Cancer Paternal Grandmother         uterine    Stroke Paternal Grandfather 50        smoker    Diabetes Paternal Grandfather     Hypertension Brother     Asthma Brother      Allergies Brother     Cancer Paternal Aunt 50        uterine   [4]   Social History  Socioeconomic History    Marital status:    Tobacco Use    Smoking status: Never    Smokeless tobacco: Never   Vaping Use    Vaping status: Never Used   Substance and Sexual Activity    Alcohol use: Yes     Alcohol/week: 2.0 standard drinks of alcohol     Types: 1 Glasses of wine, 1 Cans of beer per week    Drug use: No    Sexual activity: Yes     Birth control/protection: Vasectomy, Hysterectomy   Other Topics Concern    Caffeine Concern Yes     Comment: Per NG: Coffee ( 1 cup daily)    History of tanning No    Reaction to local anesthetic No   [5]   Current Outpatient Medications   Medication Sig Dispense Refill    methocarbamol 500 MG Oral Tab Take 1 tablet (500 mg total) by mouth 3 (three) times daily.      zolpidem 5 MG Oral Tab TAKE 1 TABLET(5 MG) BY MOUTH EVERY NIGHT AS NEEDED FOR SLEEP 30 tablet 1    Tirzepatide (MOUNJARO SC) Inject into the skin. [facility]      sertraline 25 MG Oral Tab Take 1 tablet (25 mg total) by mouth daily. 90 tablet 3    tretinoin 0.025 % External Cream Apply pea sized amount to the full face at night, making sure to avoid the eyes and lips. Wash off in the morning. Start using every other night and then progress to every night as tolerated. Apply moisturizer nightly to avoid excessive drying 45 g 0    albuterol (PROAIR HFA) 108 (90 Base) MCG/ACT Inhalation Aero Soln Inhale 2 puffs into the lungs every 6 (six) hours as needed for Wheezing. 2 each 3    Levocetirizine Dihydrochloride (XYZAL) 5 MG Oral Tab Take 1 tablet (5 mg total) by mouth nightly. 90 tablet 1    Fluticasone Propionate (FLONASE) 50 MCG/ACT Nasal Suspension 1 spray by Nasal route daily. 1 Bottle 5

## 2025-05-13 ENCOUNTER — OFFICE VISIT (OUTPATIENT)
Dept: INTERNAL MEDICINE CLINIC | Facility: CLINIC | Age: 45
End: 2025-05-13

## 2025-05-13 ENCOUNTER — TELEPHONE (OUTPATIENT)
Dept: ALLERGY | Facility: CLINIC | Age: 45
End: 2025-05-13

## 2025-05-13 VITALS
OXYGEN SATURATION: 98 % | HEIGHT: 61 IN | WEIGHT: 119 LBS | TEMPERATURE: 98 F | HEART RATE: 77 BPM | DIASTOLIC BLOOD PRESSURE: 56 MMHG | SYSTOLIC BLOOD PRESSURE: 90 MMHG | RESPIRATION RATE: 16 BRPM | BODY MASS INDEX: 22.47 KG/M2

## 2025-05-13 DIAGNOSIS — J45.20 MILD INTERMITTENT ASTHMA WITHOUT COMPLICATION (HCC): ICD-10-CM

## 2025-05-13 DIAGNOSIS — G47.00 INSOMNIA, UNSPECIFIED TYPE: ICD-10-CM

## 2025-05-13 DIAGNOSIS — E78.5 DYSLIPIDEMIA: ICD-10-CM

## 2025-05-13 DIAGNOSIS — Z12.31 ENCOUNTER FOR SCREENING MAMMOGRAM FOR MALIGNANT NEOPLASM OF BREAST: ICD-10-CM

## 2025-05-13 DIAGNOSIS — Z00.00 ANNUAL PHYSICAL EXAM: Primary | ICD-10-CM

## 2025-05-13 DIAGNOSIS — F41.9 ANXIETY: ICD-10-CM

## 2025-05-13 DIAGNOSIS — R32 URINARY INCONTINENCE, UNSPECIFIED TYPE: ICD-10-CM

## 2025-05-13 PROCEDURE — 3008F BODY MASS INDEX DOCD: CPT | Performed by: INTERNAL MEDICINE

## 2025-05-13 PROCEDURE — 99396 PREV VISIT EST AGE 40-64: CPT | Performed by: INTERNAL MEDICINE

## 2025-05-13 PROCEDURE — 3078F DIAST BP <80 MM HG: CPT | Performed by: INTERNAL MEDICINE

## 2025-05-13 PROCEDURE — 3074F SYST BP LT 130 MM HG: CPT | Performed by: INTERNAL MEDICINE

## 2025-05-13 RX ORDER — ALBUTEROL SULFATE 90 UG/1
2 INHALANT RESPIRATORY (INHALATION) EVERY 6 HOURS PRN
Qty: 2 EACH | Refills: 3 | Status: SHIPPED | OUTPATIENT
Start: 2025-05-13

## 2025-05-13 RX ORDER — ZOLPIDEM TARTRATE 5 MG/1
TABLET ORAL
Qty: 90 TABLET | Refills: 2 | Status: SHIPPED | OUTPATIENT
Start: 2025-05-13

## 2025-05-13 RX ORDER — SERTRALINE HYDROCHLORIDE 25 MG/1
25 TABLET, FILM COATED ORAL DAILY
Qty: 90 TABLET | Refills: 3 | Status: SHIPPED | OUTPATIENT
Start: 2025-05-13

## 2025-05-13 NOTE — PROGRESS NOTES
Virginie Dacosta is a 44 year old female.  Chief Complaint   Patient presents with    Physical     ANNUAL PHYSICAL  Mammogram  22                HPI:   Virginie Dacosta is a 44 year old female who presents for an annual physical examination.     PMH:   asthma but is much better controlled; she uses her inhaler about once a year.   She had allergies, and was treated by Dr. Encarnacion. She had multiple sinus infections and had her deviated septum fixed by Dr. Leonard.   She had cystic acne, and saw SAMY Arita at [facility], dermatology, was put on accutane. While on this, the total cholesterol increased to 300-400. She has been off accutane for 2 years and cholesterol is back to baseline.   Dyslipidemia  anxiety, and has been on zoloft for many years. She sees Hafsa Berger (therapist).       Reports regular periods 5-6 days in length since age of 10. She has 3 kids via , and had 2 miscarriages between the 2nd and 3rd pregnancies. Denies any complications during pregnancy. She has always had normal pap smears. Last pap 2024 with Dr. Medrano    PS:  Microdiscectomy lumbar  Hysterectomy  B/l bunion surgery      Colonoscopy 2023: benign polyp; Dr. Torrez, repeat due     TODAY:  Overall feeling well; no major complaints today  -didn't go for pelvic floor therapy last year--would like to try this year    Wt Readings from Last 6 Encounters:   25 119 lb (54 kg)   24 127 lb (57.6 kg)   24 133 lb (60.3 kg)   23 144 lb (65.3 kg)   23 144 lb 12.8 oz (65.7 kg)   23 145 lb (65.8 kg)     Body mass index is 22.48 kg/m².       Current Outpatient Medications   Medication Sig Dispense Refill    methocarbamol 500 MG Oral Tab Take 1 tablet (500 mg total) by mouth 3 (three) times daily.      zolpidem 5 MG Oral Tab TAKE 1 TABLET(5 MG) BY MOUTH EVERY NIGHT AS NEEDED FOR SLEEP 30 tablet 1    Tirzepatide (MOUNJARO SC) Inject into the skin. [facility]       sertraline 25 MG Oral Tab Take 1 tablet (25 mg total) by mouth daily. 90 tablet 3    tretinoin 0.025 % External Cream Apply pea sized amount to the full face at night, making sure to avoid the eyes and lips. Wash off in the morning. Start using every other night and then progress to every night as tolerated. Apply moisturizer nightly to avoid excessive drying 45 g 0    albuterol (PROAIR HFA) 108 (90 Base) MCG/ACT Inhalation Aero Soln Inhale 2 puffs into the lungs every 6 (six) hours as needed for Wheezing. 2 each 3    Levocetirizine Dihydrochloride (XYZAL) 5 MG Oral Tab Take 1 tablet (5 mg total) by mouth nightly. 90 tablet 1    Fluticasone Propionate (FLONASE) 50 MCG/ACT Nasal Suspension 1 spray by Nasal route daily. 1 Bottle 5      Past Medical History:    Allergic rhinitis    Allergy    Per NG: Decensitization    Anesthesia complication    Anxiety    Asthma (HCC)    allergy induced    Back problem    Depression    Deviated nasal septum    Per NG: nasal septoplasty    Eczema    Extrinsic asthma, unspecified    High cholesterol    History of ethmoidectomy    History of surgery    Per NG: maxillary antrostomy    Hx of motion sickness    Hyperlipidemia    PONV (postoperative nausea and vomiting)    Visual impairment    contacts/glasses      Past Surgical History:   Procedure Laterality Date    Back surgery      Back surgery  2018    L5-S1 Revision Decomp Fusion     Colonoscopy N/A 2016    Procedure: COLONOSCOPY;  Surgeon: Kenyetta Mcghee MD;  Location: [facility] ENDOSCOPY    Colonoscopy N/A 2023    Procedure: COLONOSCOPY;  Surgeon: Gabriel Torrez MD;  Location: [facility] ENDO    Correct bunion,othr methods      bilateral    D & c  2005    Hysterectomy  20    Leg/ankle surgery proc unlisted Right 2015    Ligament repair.       2007    10/09/2001    Sinus surgery        septoplasty       Family History   Problem Relation Age of Onset    Cancer Father         Non-small  cell lung cancer    Lipids Mother 55    No Known Problems Daughter     No Known Problems Son     No Known Problems Son     Dementia Maternal Grandmother 70    Cancer Maternal Grandfather         Uterine cancer    Cancer Paternal Grandmother         uterine    Stroke Paternal Grandfather 50        smoker    Diabetes Paternal Grandfather     Hypertension Brother     Asthma Brother     Allergies Brother     Cancer Paternal Aunt 50        uterine      Social History:   Social History     Socioeconomic History    Marital status:    Tobacco Use    Smoking status: Never    Smokeless tobacco: Never   Vaping Use    Vaping status: Never Used   Substance and Sexual Activity    Alcohol use: Yes     Alcohol/week: 2.0 standard drinks of alcohol     Types: 1 Glasses of wine, 1 Cans of beer per week    Drug use: No    Sexual activity: Yes     Birth control/protection: Vasectomy, Hysterectomy   Other Topics Concern    Caffeine Concern Yes     Comment: Per NG: Coffee ( 1 cup daily)    History of tanning No    Reaction to local anesthetic No          REVIEW OF SYSTEMS:   GENERAL: feels well otherwise  SKIN: denies any unusual skin lesions  EYES:denies blurred vision or double vision  HEENT: denies nasal congestion, sinus pain, ST, sore throat  LUNGS: denies shortness of breath with exertion, cough or wheezing  BREAST: denies masses or nipple discharge  CARDIOVASCULAR: denies chest pain, pressure, or palpitations  GI: denies abdominal pain, nausea, vomiting, diarrhea, constipation, hematochezia, or melena  : denies dysuria, urinary frequency, vaginal discharge, dyspareunia, heavy menses. +urinary incontinence  NEURO: denies headaches, dizziness, focal deficits  PSYCHE: denies anxiety or depression  EXT: denies edema      EXAM:   BP 90/56   Pulse 77   Temp 98 °F (36.7 °C) (Oral)   Resp 16   Ht 5' 1\" (1.549 m)   Wt 119 lb (54 kg)   LMP 10/20/2022 (Exact Date)   SpO2 98%   BMI 22.48 kg/m²     GENERAL: well developed, well  nourished, in no apparent distress  HEENT: normal oropharynx, normal TM's  EYES: PERRLA, EOMI, conjunctivae are pink  NECK: supple, no cervical or supraclavicular LAD, no carotic bruits, no thyromegaly  BREAST: no dominant or suspicious mass, no axillary LAD  LUNGS: clear to auscultation  CARDIO: RRR, normal S1S2, no gallops or murmurs  GI: soft, NT, ND, NABS, no HSM  GYNE: deferred  EXTREMITIES: no cyanosis, clubbing or edema, +2 radial and DP pulses bilaterally      ASSESSMENT AND PLAN:     1. Annual physical examination  Colonscopy done 8/2023  Mammogram due 11/2025  Labs due 12/2025  Pap per gyne    2. Dyslipidemia  Keep working on diet/lifestyle changes    3. Anxiety  zoloft 25mg daily.     4. Insomnia  Discussed good sleep hygeine  ambien prn    5. Irregular periods  S/p hysterectomy    6. Urinary incontinence      7. Allergies  Xyzal, flonase    8. Mild intermittent asthma  Albuterol prn    9. Constipation  Miralax daily (titrate to appropriate dose)    10.overweight  Mounjaro per [facility]    11. Urinary incontinence  Rec pelvic floor therapy    Ina Barraza DO, 05/14/25, 2:01 PM

## 2025-07-22 ENCOUNTER — OFFICE VISIT (OUTPATIENT)
Dept: ALLERGY | Facility: CLINIC | Age: 45
End: 2025-07-22
Payer: COMMERCIAL

## 2025-07-22 VITALS — BODY MASS INDEX: 22 KG/M2 | WEIGHT: 119 LBS

## 2025-07-22 DIAGNOSIS — J45.20 MILD INTERMITTENT EXTRINSIC ASTHMA WITHOUT COMPLICATION (HCC): ICD-10-CM

## 2025-07-22 DIAGNOSIS — Z23 NEED FOR PROPHYLACTIC VACCINATION WITH STREPTOCOCCUS PNEUMONIAE (PNEUMOCOCCUS) AND INFLUENZA VACCINES: ICD-10-CM

## 2025-07-22 DIAGNOSIS — J30.89 SEASONAL AND PERENNIAL ALLERGIC RHINOCONJUNCTIVITIS: Primary | ICD-10-CM

## 2025-07-22 DIAGNOSIS — Z23 NEED FOR COVID-19 VACCINE: ICD-10-CM

## 2025-07-22 DIAGNOSIS — H10.10 SEASONAL AND PERENNIAL ALLERGIC RHINOCONJUNCTIVITIS: Primary | ICD-10-CM

## 2025-07-22 DIAGNOSIS — Z23 FLU VACCINE NEED: ICD-10-CM

## 2025-07-22 DIAGNOSIS — J30.2 SEASONAL AND PERENNIAL ALLERGIC RHINOCONJUNCTIVITIS: Primary | ICD-10-CM

## 2025-07-22 PROCEDURE — 99204 OFFICE O/P NEW MOD 45 MIN: CPT | Performed by: ALLERGY & IMMUNOLOGY

## 2025-07-22 RX ORDER — MONTELUKAST SODIUM 10 MG/1
10 TABLET ORAL NIGHTLY
Qty: 30 TABLET | Refills: 2 | Status: SHIPPED | OUTPATIENT
Start: 2025-07-22

## 2025-07-22 RX ORDER — LEVOCETIRIZINE DIHYDROCHLORIDE 5 MG/1
5 TABLET, FILM COATED ORAL EVERY EVENING
Qty: 90 TABLET | Refills: 1 | Status: SHIPPED | OUTPATIENT
Start: 2025-07-22

## 2025-07-22 NOTE — PROGRESS NOTES
Virginie Dacosta is a 44 year old female.    HPI:   No chief complaint on file.    Patient is a 44-year-old female who presents for allergy evaluation upon referral of her PCP with a chief complaint of allergies  Reviewed records show patient last seen by me in 2019 for allergic rhinitis asthma chronic sinusitis.  Prior nasal septal repair in the past.  Prior immunotherapy to underlying environmental allergens including trees grass ragweed and weeds dust mites cats and dogs  Medication list include Xyzal Flonase and albuterol    PCP is Dr. Madi Monaco reviewed    Today patient reports    History of Present Illness  Virginie Dacosta is a 44 year old female with asthma who presents for evaluation of asthma control.    Asthma has been well-controlled with only one episode of mild wheezing this year, not requiring emergency intervention. She uses her inhaler infrequently, about once a week or less, and has not needed prednisone or emergency room visits in the past year. She was previously on Singulair but has not used it for a long time. She has a supply of albuterol inhalers, which she finds effective when needed.    Her allergies have been particularly challenging this year, attributed to a mild winter and increased pollen counts. She has been using Xyzal daily due to a bad allergy season and notes that she cannot stop it currently. She uses a mask during poor air quality days, especially due to wildfires and pollution.    Current medications include methocarbamol as needed, citrulline 25 mg, Mounjaro (which she is weaning off), Contrave, Differin cream, and zolpidem.    Family history includes her daughter Luci and son Ramsey, both of whom experience allergies, with Ramsey being reluctant to medicate. Her  has had success with allergy shots.    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 She works from home and  goes into the office twice a week. No recent emergency room visits or need for prednisone for asthma. No rashes reported. Acid reflux is present but not requiring medication.         HISTORY:  Past Medical History[1]   Past Surgical History[2]   Family History[3]   Social History: Short Social Hx on File[4]     Medications (Active prior to today's visit):  Current Medications[5]    Allergies:  Allergies[6]      ROS:     Allergic/Immuno:  See HPI  Cardiovascular:  Negative for irregular heartbeat/palpitations, chest pain, edema  Constitutional:  Negative night sweats,weight loss, irritability and lethargy  Endocrine:  Negative for cold intolerance, polydipsia and polyphagia  ENMT:  Negative for ear drainage, hearing loss and nasal drainage  Eyes:  Negative for eye discharge and vision loss  Gastrointestinal:  Negative for abdominal pain, diarrhea and vomiting  Genitourinary:  Negative for dysuria and hematuria  Hema/Lymph:  Negative for easy bleeding and easy bruising  Integumentary:  Negative for pruritus and rash  Musculoskeletal:  Negative for joint symptoms  Neurological:  Negative for dizziness, seizures  Psychiatric:  Negative for inappropriate interaction and psychiatric symptoms  Respiratory:  Negative for cough, dyspnea and wheezing      PHYSICAL EXAM:   Constitutional: responsive, no acute distress noted  Head/Face: NC/Atraumatic  Eyes/Vision: conjunctiva and lids are normal extraocular motion is intact   Ears/Audiometry: tympanic membranes are normal bilaterally hearing is grossly intact  Nose/Mouth/Throat: nose and throat are clear mucous membranes are moist   Neck/Thyroid: neck is supple without adenopathy  Lymphatic: no abnormal cervical, supraclavicular or axillary adenopathy is noted  Respiratory: normal to inspection lungs are clear to auscultation bilaterally normal respiratory effort   Cardiovascular: regular rate and rhythm no murmurs, gallups, or rubs  Abdomen: soft non-tender  non-distended  Skin/Hair: no unusual rashes present  Extremities: no edema, cyanosis, or clubbing  Neurological:Oriented to time, place, person & situation       ASSESSMENT/PLAN:   Assessment   Encounter Diagnoses   Name Primary?    Seasonal and perennial allergic rhinoconjunctivitis Yes    Flu vaccine need     Need for COVID-19 vaccine     Need for prophylactic vaccination with Streptococcus pneumoniae (Pneumococcus) and Influenza vaccines     Mild intermittent extrinsic asthma without complication (HCC)        Assessment & Plan  Mild persistent asthma  Asthma is mild and well-controlled with infrequent symptoms. She reports only one episode of wheezing in the past year, likely due to seasonal allergies. No emergency room visits or need for prednisone. Albuterol is effective when used. No current need for daily controller medication based on symptom frequency and severity.  - Continue albuterol as needed for asthma symptoms  - Prescribe Singulair 30-day supply for potential use in managing allergic rhinitis and mild asthma  - Advise to avoid known asthma triggers, such as cats  - Monitor asthma symptoms and use of albuterol; consider controller medication if symptoms increase    Gastro-esophageal reflux disease without esophagitis  Reports mild gastro-esophageal reflux symptoms, not requiring medication at this time.    General Health Maintenance  Discussed the importance of preventive health measures such as mammograms, Pap smears, and colonoscopies.    Follow-up  Discussed follow-up care and monitoring of asthma symptoms.  - Schedule annual follow-up appointment unless symptoms worsen  - Contact the office if asthma symptoms increase or if there is a need for a controller medication            Orders This Visit:  No orders of the defined types were placed in this encounter.      Meds This Visit:  Requested Prescriptions      No prescriptions requested or ordered in this encounter       Imaging & Referrals:  None      2025  Jose Encarnacion MD      If medication samples were provided today, they were provided solely for patient education and training related to self administration of these medications.  Teaching, instruction and sample was provided to the patient by myself.  Teaching included  a review of potential adverse side effects as well as potential efficacy.  Patient's questions were answered in regards to medication administration and dosing and potential side effects. Teaching was provided via the teach back method         [1]   Past Medical History:   Allergic rhinitis    Allergy    Per NG: Decensitization    Anesthesia complication    Anxiety    Asthma (HCC)    allergy induced    Back problem    Depression    Deviated nasal septum    Per NG: nasal septoplasty    Eczema    Extrinsic asthma, unspecified    High cholesterol    History of ethmoidectomy    History of surgery    Per NG: maxillary antrostomy    Hx of motion sickness    Hyperlipidemia    PONV (postoperative nausea and vomiting)    Visual impairment    contacts/glasses   [2]   Past Surgical History:  Procedure Laterality Date    Back surgery      Back surgery  2018    L5-S1 Revision Decomp Fusion     Colonoscopy N/A 2016    Procedure: COLONOSCOPY;  Surgeon: Kenyetta Mcghee MD;  Location: [facility] ENDOSCOPY    Colonoscopy N/A 2023    Procedure: COLONOSCOPY;  Surgeon: Gabriel Torrez MD;  Location: [facility] ENDO    Correct bunion,othr methods      bilateral    D & c  2005    Hysterectomy  20    Leg/ankle surgery proc unlisted Right 2015    Ligament repair.       2007    10/09/2001    Sinus surgery        septoplasty    [3]   Family History  Problem Relation Age of Onset    Cancer Father         Non-small cell lung cancer    Lipids Mother 55    No Known Problems Daughter     No Known Problems Son     No Known Problems Son     Dementia Maternal Grandmother 70    Cancer Maternal Grandfather          Uterine cancer    Cancer Paternal Grandmother         uterine    Stroke Paternal Grandfather 50        smoker    Diabetes Paternal Grandfather     Hypertension Brother     Asthma Brother     Allergies Brother     Cancer Paternal Aunt 50        uterine   [4]   Social History  Socioeconomic History    Marital status:    Tobacco Use    Smoking status: Never    Smokeless tobacco: Never   Vaping Use    Vaping status: Never Used   Substance and Sexual Activity    Alcohol use: Yes     Alcohol/week: 2.0 standard drinks of alcohol     Types: 1 Glasses of wine, 1 Cans of beer per week    Drug use: No    Sexual activity: Yes     Birth control/protection: Vasectomy, Hysterectomy   Other Topics Concern    Caffeine Concern Yes     Comment: Per NG: Coffee ( 1 cup daily)    History of tanning No    Reaction to local anesthetic No   [5]   Current Outpatient Medications   Medication Sig Dispense Refill    zolpidem 5 MG Oral Tab TAKE 1 TABLET(5 MG) BY MOUTH EVERY NIGHT AS NEEDED FOR SLEEP 90 tablet 2    albuterol (PROAIR HFA) 108 (90 Base) MCG/ACT Inhalation Aero Soln Inhale 2 puffs into the lungs every 6 (six) hours as needed for Wheezing. 2 each 3    sertraline 25 MG Oral Tab Take 1 tablet (25 mg total) by mouth daily. 90 tablet 3    methocarbamol 500 MG Oral Tab Take 1 tablet (500 mg total) by mouth 3 (three) times daily.      Tirzepatide (MOUNJARO SC) Inject into the skin. Administered [facility] at [facility]      tretinoin 0.025 % External Cream Apply pea sized amount to the full face at night, making sure to avoid the eyes and lips. Wash off in the morning. Start using every other night and then progress to every night as tolerated. Apply moisturizer nightly to avoid excessive drying 45 g 0    Levocetirizine Dihydrochloride (XYZAL) 5 MG Oral Tab Take 1 tablet (5 mg total) by mouth nightly. 90 tablet 1    Fluticasone Propionate (FLONASE) 50 MCG/ACT Nasal Suspension 1 spray by Nasal route daily. 1 Bottle 5   [6]    Allergies  Allergen Reactions    Lactose      GI distress    Seasonal OTHER (SEE COMMENTS)     Nasal congestion     Tree, Elm Runny nose    Latex HIVES

## 2025-07-22 NOTE — PROGRESS NOTES
The following individual(s) verbally consented to be recorded using ambient AI listening technology and understand that they can each withdraw their consent to this listening technology at any point by asking the clinician to turn off or pause the recording: yes    Patient name: Virginie Dacosta

## (undated) DEVICE — LAMINECTOMY CDS: Brand: MEDLINE INDUSTRIES, INC.

## (undated) DEVICE — GAMMEX® PI HYBRID SIZE 8.5, STERILE POWDER-FREE SURGICAL GLOVE, POLYISOPRENE AND NEOPRENE BLEND: Brand: GAMMEX

## (undated) DEVICE — SUT MONOCRYL 4-0 PS-2 Y496G

## (undated) DEVICE — WIRE FX PRCPT KRSH BVL BLNT

## (undated) DEVICE — TRI-LUMEN FILTERED TUBE SET WITH ACTIVATED CHARCOAL FILTER: Brand: AIRSEAL

## (undated) DEVICE — INSUFFLATION NEEDLE TO ESTABLISH PNEUMOPERITONEUM.: Brand: INSUFFLATION NEEDLE

## (undated) DEVICE — MONOPOLAR CURVED SCISSORS: Brand: ENDOWRIST

## (undated) DEVICE — NV CLIP DSC&IN-LINE ACTIVATOR

## (undated) DEVICE — KIT CLEAN ENDOKIT 1.1OZ GOWNX2

## (undated) DEVICE — 3M™ MICROFOAM™ TAPE 1528-4: Brand: 3M™ MICROFOAM™

## (undated) DEVICE — SUT VICRYL 0 UR-6 J603H

## (undated) DEVICE — DRAPE SHEET LAPCHOLE 124X100X7

## (undated) DEVICE — STERILE SURGICAL LUBRICANT, METAL TUBE: Brand: SURGILUBE

## (undated) DEVICE — LAPAROTOMY SPONGE - RF AND X-RAY DETECTABLE PRE-WASHED: Brand: SITUATE

## (undated) DEVICE — VCARE MEDIUM, UTERINE MANIPULATOR, VAGINAL-CERVICAL-AHLUWALIA'S-RETRACTOR-ELEVATOR: Brand: VCARE

## (undated) DEVICE — SURGIFLO

## (undated) DEVICE — 3.0MM PRECISION NEURO (MATCH HEAD)

## (undated) DEVICE — COLUMN DRAPE

## (undated) DEVICE — TIP COVER ACCESSORY

## (undated) DEVICE — SHEET,DRAPE,70X100,STERILE: Brand: MEDLINE

## (undated) DEVICE — WRAP COOLING BACK W/NO PILLOW

## (undated) DEVICE — NV I-PAS III DIAMOND TIP

## (undated) DEVICE — BLDE PRCPT FSCL SPLTR DISP

## (undated) DEVICE — SOLUTION  .9 3000ML

## (undated) DEVICE — CLOSURE EXOFIN 1.0ML

## (undated) DEVICE — TRAY SURESTEP 16 LUB DRAINAGE

## (undated) DEVICE — SOLUTION  .9 1000ML BTL

## (undated) DEVICE — COVER,TABLE,HVY DUTY,EXT,77"X96",STRL: Brand: MEDLINE

## (undated) DEVICE — CANNULA SEAL

## (undated) DEVICE — MEGA NEEDLE DRIVER: Brand: ENDOWRIST

## (undated) DEVICE — NVM5 NEEDLE MODULE M/E

## (undated) DEVICE — SKIN PREP TRAY 4 COMPARTM TRAY: Brand: MEDLINE INDUSTRIES, INC.

## (undated) DEVICE — TRANSPOSAL ULTRAFLEX DUO/QUAD ULTRA CART MANIFOLD

## (undated) DEVICE — DRAPE C-ARM UNIVERSAL

## (undated) DEVICE — SUTURE VICRYL 2-0 FSL

## (undated) DEVICE — NUVAMAP O.R. TECHNOLOGY

## (undated) DEVICE — DRAPE TABLE COVER 44X90 TC-10

## (undated) DEVICE — DRILL SRG OIL CRTDG MAESTRO

## (undated) DEVICE — APPLICATOR CHLORAPREP 26ML

## (undated) DEVICE — SUCTION CANISTER, 3000CC,SAFELINER: Brand: DEROYAL

## (undated) DEVICE — MEGA SUTURECUT ND: Brand: ENDOWRIST

## (undated) DEVICE — 60 ML SYRINGE REGULAR TIP: Brand: MONOJECT

## (undated) DEVICE — ROBOTIC: Brand: MEDLINE INDUSTRIES, INC.

## (undated) DEVICE — LAPAROVUE VISIBILITY SYSTEM LAPAROSCOPIC SOLUTIONS: Brand: LAPAROVUE

## (undated) DEVICE — KENDALL SCD EXPRESS SLEEVES, THIGH LENGTH, MEDIUM: Brand: KENDALL SCD

## (undated) DEVICE — C-ARMOR C-ARM EQUIPMENT COVERS CLEAR STERILE UNIVERSAL FIT 12 PER CASE: Brand: C-ARMOR

## (undated) DEVICE — MAXCESS MAS TLIF 2 KIT ACCESS

## (undated) DEVICE — GAMMEX® PI HYBRID SIZE 8, STERILE POWDER-FREE SURGICAL GLOVE, POLYISOPRENE AND NEOPRENE BLEND: Brand: GAMMEX

## (undated) DEVICE — AIRSEAL 8 MM ACCESS PORT AND LOW PROFILE OBTURATOR WITH BLADELESS OPTICAL TIP, 120 MM LENGTH: Brand: AIRSEAL

## (undated) DEVICE — SOL  .9 1000ML BTL

## (undated) DEVICE — Device: Brand: DUAL NARE NASAL CANNULAE FEMALE LUER CON 7FT O2 TUBE

## (undated) DEVICE — SNARE ENDOSCOPIC 10MM ROUND

## (undated) DEVICE — MEDI-VAC NON-CONDUCTIVE SUCTION TUBING 6MM X 1.8M (6FT.) L: Brand: CARDINAL HEALTH

## (undated) DEVICE — SUTURE VICRYL 1 OS-6

## (undated) DEVICE — NEOCLOSE GUIDE WITH DARTS

## (undated) DEVICE — SYSTEM SURGYPAD VELCRO 36IN

## (undated) DEVICE — BLADELESS OBTURATOR: Brand: WECK VISTA

## (undated) DEVICE — VESSEL SEALER EXTEND: Brand: ENDOWRIST

## (undated) DEVICE — UNDYED BRAIDED (POLYGLACTIN 910), SYNTHETIC ABSORBABLE SUTURE: Brand: COATED VICRYL

## (undated) DEVICE — SUTURE VLOC 180 0 9" 0346

## (undated) DEVICE — VIOLET BRAIDED (POLYGLACTIN 910), SYNTHETIC ABSORBABLE SUTURE: Brand: COATED VICRYL

## (undated) DEVICE — GRAFT DELIVERY SYS MODULE

## (undated) DEVICE — PERMANENT CAUTERY HOOK: Brand: ENDOWRIST

## (undated) DEVICE — MARYLAND BIPOLAR FORCEPS: Brand: ENDOWRIST;DAVINCI SI

## (undated) DEVICE — LEGGINGS SRG 48X31IN CUF STRL

## (undated) DEVICE — RELINE POWER

## (undated) DEVICE — NVM5 NEEDLE MODULE S

## (undated) DEVICE — KIT ENDO ORCAPOD 160/180/190

## (undated) DEVICE — Device

## (undated) DEVICE — ARM DRAPE

## (undated) DEVICE — LIGHT HANDLE

## (undated) DEVICE — SNARE OPTMZ PLPCTM TRP

## (undated) NOTE — MR AVS SNAPSHOT
After Visit Summary   7/2/2021    Amarjit Cramer    MRN: [id number]           Visit Information     Date & Time  7/2/2021 11:40 AM Provider  Sheryl Rae DO Department  [facility], [address] [facility] [address], [employer]  Phone  [phone] Following     Normal Orders This Visit    HPV HIGH RISK , THIN PREP COLLECTION [ZHF4070 CUSTOM]     THINPREP PAP SMEAR B [SGP1729 CUSTOM]     THINPREP PAP SMEAR ONLY [[id number] CUSTOM]     Future Labs/Procedures Expected by Expires    HPV HIGH RISK , THIN MS attention.  Average cost  $70*   [facility]  Monday – Friday  8:00 am – 8:00 pm   Saturday – Sunday  8:00 am – 4:00 pm    WALK-IN CARE  Primary Care Providers  Treatment for acute illness   or injury that are   non-lif

## (undated) NOTE — LETTER
[facility] Department  Phone: [phone]  Right Fax: [phone]  [facility] 20 Shanda Shoemaker RN Date: 3/19/18    Patient Name: Jorge Stefanie  Surgery Date: 2018    CSN: [id number]  Medical Record: [id number]   : 1

## (undated) NOTE — MR AVS SNAPSHOT
[facility] [place] [address] [place]  [address] [phone]               Thank you for choosing us for your health care visit with Trey Del Cid MD.  We are glad to serve you and happy to provide you with this summary o Sertraline HCl 50 MG Tabs   TAKE 1 TABLET (50 MG TOTAL) BY MOUTH DAILY. Commonly known as:  ZOLOFT                Where to Get Your Medications      [id number] [place] [facility] [address]  AT [address] [place] [address]

## (undated) NOTE — MR AVS SNAPSHOT
[facility]  [facility] [address] [address]  [phone]               Thank you for choosing us for your health care visit with Tamar Wilkinson MD.  We are glad to serve you and happy to provide you with this summary of your visit.   Ple Commonly known as:  ZOLOFT                Where to Get Your Medications      [id number] [place] - [address]. AT [address] [facility], [phone], [address] [place], [place] [address], [facility] [address]    Ho

## (undated) NOTE — LETTER
MAJOR CASE PREOPERATIVE INSTRUCTIONS    10/21/2022      Dear Roula Obregon,    Your are having a Robotic Total Laparoscopic Hysterectomy- Bilateral Salpingectomy on Monday,2022 at 1:30pm at [facility].    Do not eat or drink anything (including water) after midnight the night before surgery. Only take in clear liquids on , the day before surgery. Some examples of clear liquids are water, coffee/tea without milk/cream, clear broth, apple juice, or any juice that you can see through, jello, popsicles, lemon ice, or clear soda like ginger ale or 7-up. Please review and follow the attached Preoperative Antimicrobial Wash/Bath Instructions. You are to call this office if you have any cold or flu symptoms 2 days before your scheduled surgery. Please avoid ALL aspirin 7 days before surgery. Avoid Ibuprofen, Motrin, Aleve, or Naprosyn for 3 days before surgery. You will be contacted by PreAdmission Testing (PAT) usually within the week before surgery. They will take a short medical history and let you know if any preoperative testing is needed. If you have any questions for preadmission testing please feel free to contact them by calling [phone]. In accordance with IDPH guidelines we must ask that you self-quarantine as best as possible until the day of your surgical/non-surgical procedure once you have been tested for   COVID( testing is performed with 72 hours of the scheduled procedure). It is important to take precautions against the spread of COVID-19 disease both before   and after your surgical procedure. We ask that you adhere to the followin. Avoid crowds  2. Wear a mask or face covering in public  3. Maintain social distancing  4. Practice good hygiene    I contacted your insurance and was advised no prior authorization is needed for surgery. Call our office now to schedule your post-operative appointment for 4 weeks after surgery.     Please feel free to contact our office at  if you have any questions regarding these instructions or your procedure. Sincerely,          Gem Rivera.  [facility], [address], [place]  [place] [address] [phone]    Document electronically generated by:  Raf Dempsey

## (undated) NOTE — LETTER
[address]  [address] [place], [place]  Authorization for Surgical Operation and Procedure                                                                                           I hereby authorize Kalie Toledo MD, my physician and his/her assistants (if applicable), which may include medical students, residents, and/or fellows, to perform the following surgical operation/ procedure and administer such anesthesia as may be determined necessary by my physician: Operation/Procedure name (s) COLONOSCOPY on [place]   2. I recognize that during the surgical operation/procedure, unforeseen conditions may necessitate additional or different procedures than those listed above. I, therefore, further authorize and request that the above-named surgeon, assistants, or designees perform such procedures as are, in their judgment, necessary and desirable. 3.   My surgeon/physician has discussed prior to my surgery the potential benefits, risks and side effects of this procedure; the likelihood of achieving goals; and potential problems that might occur during recuperation. They also discussed reasonable alternatives to the procedure, including risks, benefits, and side effects related to the alternatives and risks related to not receiving this procedure. I have had all my questions answered and I acknowledge that no guarantee has been made as to the result that may be obtained. 4.   Should the need arise during my operation/procedure, which includes change of level of care prior to discharge, I also consent to the administration of blood and/or blood products. Further, I understand that despite careful testing and screening of blood or blood products by collecting agencies, I may still be subject to ill effects as a result of receiving a blood transfusion and/or blood products.   The following are some, but not all, of the potential risks that can occur: fever and allergic reactions, hemolytic reactions, transmission of diseases such as Hepatitis, AIDS and Cytomegalovirus (CMV) and fluid overload. In the event that I wish to have an autologous transfusion of my own blood, or a directed donor transfusion, I will discuss this with my physician. Check only if Refusing Blood or Blood Products  I understand refusal of blood or blood products as deemed necessary by my physician may have serious consequences to my condition to include possible death. I hereby assume responsibility for my refusal and release the hospital, its personnel, and my physicians from any responsibility for the consequences of my refusal.    o  Refuse   5. I authorize the use of any specimen, organs, tissues, body parts or foreign objects that may be removed from my body during the operation/procedure for diagnosis, research or teaching purposes and their subsequent disposal by hospital authorities. I also authorize the release of specimen test results and/or written reports to my treating physician on the hospital medical staff or other referring or consulting physicians involved in my care, at the discretion of the Pathologist or my treating physician. 6.   I consent to the photographing or videotaping of the operations or procedures to be performed, including appropriate portions of my body for medical, scientific, or educational purposes, provided my identity is not revealed by the pictures or by descriptive texts accompanying them. If the procedure has been photographed/videotaped, the surgeon will obtain the original picture, image, videotape or CD. The hospital will not be responsible for storage, release or maintenance of the picture, image, tape or CD.    7.   I consent to the presence of a  or observers in the operating room as deemed necessary by my physician or their designees.     8.   I recognize that in the event my procedure results in extended X-Ray/fluoroscopy time, I may develop a skin reaction. 9. If I have a Do Not Attempt Resuscitation (DNAR) order in place, that status will be suspended while in the operating room, procedural suite, and during the recovery period unless otherwise explicitly stated by me (or a person authorized to consent on my behalf). The surgeon or my attending physician will determine when the applicable recovery period ends for purposes of reinstating the DNAR order. 10. Patients having a sterilization procedure: I understand that if the procedure is successful the results will be permanent and it will therefore be impossible for me to inseminate, conceive, or bear children. I also understand that the procedure is intended to result in sterility, although the result has not been guaranteed. 11. I acknowledge that my physician has explained sedation/analgesia administration to me including the risk and benefits I consent to the administration of sedation/analgesia as may be necessary or desirable in the judgment of my physician. I CERTIFY THAT I HAVE READ AND FULLY UNDERSTAND THE ABOVE CONSENT TO OPERATION and/or OTHER PROCEDURE.     _________________________________________ _________________________________     ___________________________________  Signature of Patient     Signature of Responsible Person                   Printed Name of Responsible Person                              _________________________________________ ______________________________        ___________________________________  Signature of Witness         Date  Time         Relationship to Patient    STATEMENT OF PHYSICIAN My signature below affirms that prior to the time of the procedure; I have explained to the patient and/or his/her legal representative, the risks and benefits involved in the proposed treatment and any reasonable alternative to the proposed treatment.  I have also explained the risks and benefits involved in refusal of the proposed treatment and alternatives to the proposed treatment and have answered the patient's questions.  If I have a significant financial interest in a co-management agreement or a significant financial interest in any product or implant, or other significant relationship used in this procedure/surgery, I have disclosed this and had a discussion with my patient.     _______________________________________________________________ _____________________________  Richelle Bradley of Physician)                                                                                         (Date)                                   (Time)  Patient Name: Betina Jay    : 11/15/1980   Printed: 2023      Medical Record #: [id number]                                              Page 1 of 1

## (undated) NOTE — LETTER
AUTHORIZATION FOR SURGICAL OPERATION OR OTHER PROCEDURE    1. I hereby authorize Dr. Dipti Durham, and [address] [place] [address] staff assigned to my case to perform the following operation and/or procedure at the [address] [place] [address]:      [facility]       2.  My physician has explained the nature and purpose of the operation or other procedure, possible alternative methods of treatment, the risks involved, and the possibility of complication to me. I acknowledge that no guarantee has been made as to the result that may be obtained. 3.  I recognize that, during the course of this operation, or other procedure, unforseen conditions may necessitate additional or different procedure than those listed above. I, therefore, further authorize and request that the above named physician, his/her physician assistants or designees perform such procedures as are, in his/her professional opinion, necessary and desirable. 4.  Any tissue or organs removed in the operation or other procedure may be disposed of by and at the discretion of the [address] [place] [address] and [facility] AT [facility] [place]. 5.  I understand that in the event of a medical emergency, I will be transported by local paramedics to [facility] or other hospital emergency department. 6.  I certify that I have read and fully understand the above consent to operation and/or other procedure. 7.  I acknowledge that my physician has explained sedation/analgesia administration to me including the risks and benefits. I consent to the administration of sedation/analgesia as may be necessary or desirable in the judgement of my physician. Witness signature: ___________________________________________________ Date:  ______/______/_____                    Time:  ________ A. M.  P.M.        Patient Name:  ______________________________________________________  (please print)      Patient signature: ___________________________________________________             Relationship to Patient:           []  Parent    Responsible person                          []  Spouse  In case of minor or                    [] Other  _____________   Incompetent name:  __________________________________________________                               (please print)      _____________      Responsible person  In case of minor or  Incompetent signature:  _______________________________________________    Statement of Physician  My signature below affirms that prior to the time of the procedure, I have explained to the patient and/or his/her guardian, the risks and benefits involved in the proposed treatment and any reasonable alternative to the proposed treatment. I have also explained the risks and benefits involved in the refusal of the proposed treatment and have answered the patient's questions.                         Date:  ______/______/_______  Provider                      Signature:  __________________________________________________________       Time:  ___________ A.M    P.M.

## (undated) NOTE — LETTER
Date: 6/12/2019    Patient Name: Gudelia Cheema          To Whom it may concern: The above patient was seen at the [facility] for treatment of a medical condition. The patient may return to work on 6/12.         Sincerely,    Josephine Catherine